# Patient Record
Sex: FEMALE | Race: WHITE | NOT HISPANIC OR LATINO | Employment: FULL TIME | URBAN - METROPOLITAN AREA
[De-identification: names, ages, dates, MRNs, and addresses within clinical notes are randomized per-mention and may not be internally consistent; named-entity substitution may affect disease eponyms.]

---

## 2021-05-20 ENCOUNTER — OFFICE VISIT (OUTPATIENT)
Dept: PSYCHIATRY | Facility: CLINIC | Age: 26
End: 2021-05-20
Payer: COMMERCIAL

## 2021-05-20 VITALS
RESPIRATION RATE: 19 BRPM | DIASTOLIC BLOOD PRESSURE: 85 MMHG | BODY MASS INDEX: 34.71 KG/M2 | WEIGHT: 208.31 LBS | HEIGHT: 65 IN | HEART RATE: 114 BPM | SYSTOLIC BLOOD PRESSURE: 128 MMHG

## 2021-05-20 DIAGNOSIS — F41.1 GAD (GENERALIZED ANXIETY DISORDER): ICD-10-CM

## 2021-05-20 DIAGNOSIS — F33.1 MODERATE EPISODE OF RECURRENT MAJOR DEPRESSIVE DISORDER: Primary | ICD-10-CM

## 2021-05-20 DIAGNOSIS — F41.0 PANIC ATTACKS: ICD-10-CM

## 2021-05-20 PROCEDURE — 99999 PR PBB SHADOW E&M-NEW PATIENT-LVL IV: ICD-10-PCS | Mod: PBBFAC,,, | Performed by: STUDENT IN AN ORGANIZED HEALTH CARE EDUCATION/TRAINING PROGRAM

## 2021-05-20 PROCEDURE — 99999 PR PBB SHADOW E&M-NEW PATIENT-LVL IV: CPT | Mod: PBBFAC,,, | Performed by: STUDENT IN AN ORGANIZED HEALTH CARE EDUCATION/TRAINING PROGRAM

## 2021-05-20 PROCEDURE — 90792 PR PSYCHIATRIC DIAGNOSTIC EVALUATION W/MEDICAL SERVICES: ICD-10-PCS | Mod: S$GLB,,, | Performed by: STUDENT IN AN ORGANIZED HEALTH CARE EDUCATION/TRAINING PROGRAM

## 2021-05-20 PROCEDURE — 3008F PR BODY MASS INDEX (BMI) DOCUMENTED: ICD-10-PCS | Mod: CPTII,S$GLB,, | Performed by: STUDENT IN AN ORGANIZED HEALTH CARE EDUCATION/TRAINING PROGRAM

## 2021-05-20 PROCEDURE — 1125F PR PAIN SEVERITY QUANTIFIED, PAIN PRESENT: ICD-10-PCS | Mod: S$GLB,,, | Performed by: STUDENT IN AN ORGANIZED HEALTH CARE EDUCATION/TRAINING PROGRAM

## 2021-05-20 PROCEDURE — 1125F AMNT PAIN NOTED PAIN PRSNT: CPT | Mod: S$GLB,,, | Performed by: STUDENT IN AN ORGANIZED HEALTH CARE EDUCATION/TRAINING PROGRAM

## 2021-05-20 PROCEDURE — 90792 PSYCH DIAG EVAL W/MED SRVCS: CPT | Mod: S$GLB,,, | Performed by: STUDENT IN AN ORGANIZED HEALTH CARE EDUCATION/TRAINING PROGRAM

## 2021-05-20 PROCEDURE — 3008F BODY MASS INDEX DOCD: CPT | Mod: CPTII,S$GLB,, | Performed by: STUDENT IN AN ORGANIZED HEALTH CARE EDUCATION/TRAINING PROGRAM

## 2021-05-20 RX ORDER — TRIAMCINOLONE ACETONIDE 55 UG/1
2 SPRAY, METERED NASAL DAILY
COMMUNITY

## 2021-05-20 RX ORDER — MELATONIN 3 MG
CAPSULE ORAL
COMMUNITY
End: 2021-05-20 | Stop reason: ALTCHOICE

## 2021-05-20 RX ORDER — CYCLOBENZAPRINE HCL 5 MG
5 TABLET ORAL NIGHTLY
COMMUNITY
End: 2022-09-15 | Stop reason: ALTCHOICE

## 2021-05-20 RX ORDER — DIPHENHYDRAMINE HCL 50 MG
50 CAPSULE ORAL EVERY 6 HOURS PRN
COMMUNITY

## 2021-05-20 RX ORDER — INCONTINENCE PAD,LINER,DISP
EACH MISCELLANEOUS 2 TIMES DAILY
COMMUNITY
End: 2022-09-15

## 2021-05-20 RX ORDER — GUAIFENESIN, PSEUDOEPHEDRINE HYDROCHLORIDE 600; 60 MG/1; MG/1
1 TABLET, EXTENDED RELEASE ORAL
COMMUNITY
End: 2023-07-19 | Stop reason: ALTCHOICE

## 2021-05-20 RX ORDER — CETIRIZINE HYDROCHLORIDE 10 MG/1
10 TABLET ORAL DAILY
COMMUNITY

## 2021-05-20 RX ORDER — ESCITALOPRAM OXALATE 20 MG/1
20 TABLET ORAL NIGHTLY
COMMUNITY
End: 2021-05-20 | Stop reason: ALTCHOICE

## 2021-05-20 RX ORDER — ASCORBIC ACID 500 MG
500 TABLET ORAL DAILY
COMMUNITY
End: 2021-08-26

## 2021-05-20 RX ORDER — ALPRAZOLAM 0.25 MG/1
0.25 TABLET ORAL 3 TIMES DAILY PRN
COMMUNITY
End: 2022-06-30 | Stop reason: DRUGHIGH

## 2021-05-20 RX ORDER — VORTIOXETINE 10 MG/1
TABLET, FILM COATED ORAL
Qty: 30 TABLET | Refills: 2 | Status: SHIPPED | OUTPATIENT
Start: 2021-05-20 | End: 2021-06-15 | Stop reason: ALTCHOICE

## 2021-05-20 RX ORDER — AZELASTINE HYDROCHLORIDE 0.5 MG/ML
1 SOLUTION/ DROPS OPHTHALMIC 2 TIMES DAILY
COMMUNITY

## 2021-05-20 RX ORDER — ACETAMINOPHEN 325 MG/1
325 TABLET ORAL EVERY 6 HOURS PRN
COMMUNITY
End: 2023-07-19 | Stop reason: ALTCHOICE

## 2021-06-15 ENCOUNTER — OFFICE VISIT (OUTPATIENT)
Dept: PSYCHIATRY | Facility: CLINIC | Age: 26
End: 2021-06-15
Payer: COMMERCIAL

## 2021-06-15 VITALS
RESPIRATION RATE: 19 BRPM | SYSTOLIC BLOOD PRESSURE: 122 MMHG | WEIGHT: 210.75 LBS | BODY MASS INDEX: 35.11 KG/M2 | HEIGHT: 65 IN | DIASTOLIC BLOOD PRESSURE: 82 MMHG | HEART RATE: 102 BPM

## 2021-06-15 DIAGNOSIS — F41.0 PANIC ATTACKS: ICD-10-CM

## 2021-06-15 DIAGNOSIS — F33.1 MODERATE EPISODE OF RECURRENT MAJOR DEPRESSIVE DISORDER: Primary | ICD-10-CM

## 2021-06-15 DIAGNOSIS — F41.1 GAD (GENERALIZED ANXIETY DISORDER): ICD-10-CM

## 2021-06-15 PROCEDURE — 1126F PR PAIN SEVERITY QUANTIFIED, NO PAIN PRESENT: ICD-10-PCS | Mod: S$GLB,,, | Performed by: STUDENT IN AN ORGANIZED HEALTH CARE EDUCATION/TRAINING PROGRAM

## 2021-06-15 PROCEDURE — 1126F AMNT PAIN NOTED NONE PRSNT: CPT | Mod: S$GLB,,, | Performed by: STUDENT IN AN ORGANIZED HEALTH CARE EDUCATION/TRAINING PROGRAM

## 2021-06-15 PROCEDURE — 99999 PR PBB SHADOW E&M-EST. PATIENT-LVL IV: ICD-10-PCS | Mod: PBBFAC,,, | Performed by: STUDENT IN AN ORGANIZED HEALTH CARE EDUCATION/TRAINING PROGRAM

## 2021-06-15 PROCEDURE — 99999 PR PBB SHADOW E&M-EST. PATIENT-LVL IV: CPT | Mod: PBBFAC,,, | Performed by: STUDENT IN AN ORGANIZED HEALTH CARE EDUCATION/TRAINING PROGRAM

## 2021-06-15 PROCEDURE — 3008F PR BODY MASS INDEX (BMI) DOCUMENTED: ICD-10-PCS | Mod: CPTII,S$GLB,, | Performed by: STUDENT IN AN ORGANIZED HEALTH CARE EDUCATION/TRAINING PROGRAM

## 2021-06-15 PROCEDURE — 3008F BODY MASS INDEX DOCD: CPT | Mod: CPTII,S$GLB,, | Performed by: STUDENT IN AN ORGANIZED HEALTH CARE EDUCATION/TRAINING PROGRAM

## 2021-06-15 PROCEDURE — 99214 OFFICE O/P EST MOD 30 MIN: CPT | Mod: S$GLB,,, | Performed by: STUDENT IN AN ORGANIZED HEALTH CARE EDUCATION/TRAINING PROGRAM

## 2021-06-15 PROCEDURE — 99214 PR OFFICE/OUTPT VISIT, EST, LEVL IV, 30-39 MIN: ICD-10-PCS | Mod: S$GLB,,, | Performed by: STUDENT IN AN ORGANIZED HEALTH CARE EDUCATION/TRAINING PROGRAM

## 2021-06-15 RX ORDER — BUTALBITAL, ACETAMINOPHEN, AND CAFFEINE 50; 300; 40 MG/1; MG/1; MG/1
CAPSULE ORAL
COMMUNITY
Start: 2021-01-04 | End: 2022-09-15 | Stop reason: ALTCHOICE

## 2021-06-15 RX ORDER — VORTIOXETINE 20 MG/1
20 TABLET, FILM COATED ORAL DAILY
Qty: 30 TABLET | Refills: 3 | Status: SHIPPED | OUTPATIENT
Start: 2021-06-15 | End: 2021-08-26 | Stop reason: SDUPTHER

## 2021-07-23 ENCOUNTER — OFFICE VISIT (OUTPATIENT)
Dept: PSYCHIATRY | Facility: CLINIC | Age: 26
End: 2021-07-23
Payer: COMMERCIAL

## 2021-07-23 VITALS
RESPIRATION RATE: 19 BRPM | HEIGHT: 63 IN | HEART RATE: 92 BPM | WEIGHT: 208.44 LBS | SYSTOLIC BLOOD PRESSURE: 130 MMHG | BODY MASS INDEX: 36.93 KG/M2 | DIASTOLIC BLOOD PRESSURE: 84 MMHG

## 2021-07-23 DIAGNOSIS — F33.1 MODERATE EPISODE OF RECURRENT MAJOR DEPRESSIVE DISORDER: Primary | ICD-10-CM

## 2021-07-23 DIAGNOSIS — F41.1 GAD (GENERALIZED ANXIETY DISORDER): ICD-10-CM

## 2021-07-23 DIAGNOSIS — F41.0 PANIC ATTACKS: ICD-10-CM

## 2021-07-23 PROCEDURE — 99999 PR PBB SHADOW E&M-EST. PATIENT-LVL IV: ICD-10-PCS | Mod: PBBFAC,,, | Performed by: STUDENT IN AN ORGANIZED HEALTH CARE EDUCATION/TRAINING PROGRAM

## 2021-07-23 PROCEDURE — 99214 PR OFFICE/OUTPT VISIT, EST, LEVL IV, 30-39 MIN: ICD-10-PCS | Mod: S$GLB,,, | Performed by: STUDENT IN AN ORGANIZED HEALTH CARE EDUCATION/TRAINING PROGRAM

## 2021-07-23 PROCEDURE — 1126F PR PAIN SEVERITY QUANTIFIED, NO PAIN PRESENT: ICD-10-PCS | Mod: CPTII,S$GLB,, | Performed by: STUDENT IN AN ORGANIZED HEALTH CARE EDUCATION/TRAINING PROGRAM

## 2021-07-23 PROCEDURE — 1126F AMNT PAIN NOTED NONE PRSNT: CPT | Mod: CPTII,S$GLB,, | Performed by: STUDENT IN AN ORGANIZED HEALTH CARE EDUCATION/TRAINING PROGRAM

## 2021-07-23 PROCEDURE — 99214 OFFICE O/P EST MOD 30 MIN: CPT | Mod: S$GLB,,, | Performed by: STUDENT IN AN ORGANIZED HEALTH CARE EDUCATION/TRAINING PROGRAM

## 2021-07-23 PROCEDURE — 3008F PR BODY MASS INDEX (BMI) DOCUMENTED: ICD-10-PCS | Mod: CPTII,S$GLB,, | Performed by: STUDENT IN AN ORGANIZED HEALTH CARE EDUCATION/TRAINING PROGRAM

## 2021-07-23 PROCEDURE — 99999 PR PBB SHADOW E&M-EST. PATIENT-LVL IV: CPT | Mod: PBBFAC,,, | Performed by: STUDENT IN AN ORGANIZED HEALTH CARE EDUCATION/TRAINING PROGRAM

## 2021-07-23 PROCEDURE — 3008F BODY MASS INDEX DOCD: CPT | Mod: CPTII,S$GLB,, | Performed by: STUDENT IN AN ORGANIZED HEALTH CARE EDUCATION/TRAINING PROGRAM

## 2021-07-23 RX ORDER — ZOLPIDEM TARTRATE 5 MG
5 TABLET ORAL NIGHTLY PRN
Qty: 30 TABLET | Refills: 2 | Status: SHIPPED | OUTPATIENT
Start: 2021-07-23 | End: 2022-02-22 | Stop reason: SDUPTHER

## 2021-07-23 RX ORDER — ZOLPIDEM TARTRATE 5 MG
5 TABLET ORAL NIGHTLY PRN
COMMUNITY
Start: 2021-01-27 | End: 2021-07-23 | Stop reason: SDUPTHER

## 2021-07-23 RX ORDER — DEXTROAMPHETAMINE SACCHARATE, AMPHETAMINE ASPARTATE, DEXTROAMPHETAMINE SULFATE AND AMPHETAMINE SULFATE 1.25; 1.25; 1.25; 1.25 MG/1; MG/1; MG/1; MG/1
5 TABLET ORAL 2 TIMES DAILY
Qty: 6 TABLET | Refills: 0 | Status: SHIPPED | OUTPATIENT
Start: 2021-07-23 | End: 2021-07-27 | Stop reason: ALTCHOICE

## 2021-07-27 RX ORDER — LURASIDONE HYDROCHLORIDE 20 MG/1
20 TABLET, FILM COATED ORAL DAILY
Qty: 30 TABLET | Refills: 3 | Status: SHIPPED | OUTPATIENT
Start: 2021-07-27 | End: 2021-10-13 | Stop reason: SDUPTHER

## 2021-08-26 ENCOUNTER — OFFICE VISIT (OUTPATIENT)
Dept: PSYCHIATRY | Facility: CLINIC | Age: 26
End: 2021-08-26
Payer: COMMERCIAL

## 2021-08-26 VITALS
WEIGHT: 207.88 LBS | HEART RATE: 113 BPM | OXYGEN SATURATION: 98 % | DIASTOLIC BLOOD PRESSURE: 86 MMHG | SYSTOLIC BLOOD PRESSURE: 118 MMHG | BODY MASS INDEX: 34.63 KG/M2 | HEIGHT: 65 IN | RESPIRATION RATE: 18 BRPM

## 2021-08-26 DIAGNOSIS — F41.1 GAD (GENERALIZED ANXIETY DISORDER): ICD-10-CM

## 2021-08-26 DIAGNOSIS — Z65.8 PSYCHOSOCIAL STRESSORS: ICD-10-CM

## 2021-08-26 DIAGNOSIS — F33.1 MODERATE EPISODE OF RECURRENT MAJOR DEPRESSIVE DISORDER: Primary | ICD-10-CM

## 2021-08-26 DIAGNOSIS — G47.00 INSOMNIA, UNSPECIFIED TYPE: ICD-10-CM

## 2021-08-26 DIAGNOSIS — R73.09 ELEVATED HEMOGLOBIN A1C: ICD-10-CM

## 2021-08-26 DIAGNOSIS — F41.0 PANIC ATTACKS: ICD-10-CM

## 2021-08-26 PROCEDURE — 3079F PR MOST RECENT DIASTOLIC BLOOD PRESSURE 80-89 MM HG: ICD-10-PCS | Mod: CPTII,S$GLB,, | Performed by: STUDENT IN AN ORGANIZED HEALTH CARE EDUCATION/TRAINING PROGRAM

## 2021-08-26 PROCEDURE — 3079F DIAST BP 80-89 MM HG: CPT | Mod: CPTII,S$GLB,, | Performed by: STUDENT IN AN ORGANIZED HEALTH CARE EDUCATION/TRAINING PROGRAM

## 2021-08-26 PROCEDURE — 1159F MED LIST DOCD IN RCRD: CPT | Mod: CPTII,S$GLB,, | Performed by: STUDENT IN AN ORGANIZED HEALTH CARE EDUCATION/TRAINING PROGRAM

## 2021-08-26 PROCEDURE — 99214 PR OFFICE/OUTPT VISIT, EST, LEVL IV, 30-39 MIN: ICD-10-PCS | Mod: S$GLB,,, | Performed by: STUDENT IN AN ORGANIZED HEALTH CARE EDUCATION/TRAINING PROGRAM

## 2021-08-26 PROCEDURE — 99214 OFFICE O/P EST MOD 30 MIN: CPT | Mod: S$GLB,,, | Performed by: STUDENT IN AN ORGANIZED HEALTH CARE EDUCATION/TRAINING PROGRAM

## 2021-08-26 PROCEDURE — 1126F PR PAIN SEVERITY QUANTIFIED, NO PAIN PRESENT: ICD-10-PCS | Mod: CPTII,S$GLB,, | Performed by: STUDENT IN AN ORGANIZED HEALTH CARE EDUCATION/TRAINING PROGRAM

## 2021-08-26 PROCEDURE — 99999 PR PBB SHADOW E&M-EST. PATIENT-LVL IV: CPT | Mod: PBBFAC,,, | Performed by: STUDENT IN AN ORGANIZED HEALTH CARE EDUCATION/TRAINING PROGRAM

## 2021-08-26 PROCEDURE — 99999 PR PBB SHADOW E&M-EST. PATIENT-LVL IV: ICD-10-PCS | Mod: PBBFAC,,, | Performed by: STUDENT IN AN ORGANIZED HEALTH CARE EDUCATION/TRAINING PROGRAM

## 2021-08-26 PROCEDURE — 3008F PR BODY MASS INDEX (BMI) DOCUMENTED: ICD-10-PCS | Mod: CPTII,S$GLB,, | Performed by: STUDENT IN AN ORGANIZED HEALTH CARE EDUCATION/TRAINING PROGRAM

## 2021-08-26 PROCEDURE — 3008F BODY MASS INDEX DOCD: CPT | Mod: CPTII,S$GLB,, | Performed by: STUDENT IN AN ORGANIZED HEALTH CARE EDUCATION/TRAINING PROGRAM

## 2021-08-26 PROCEDURE — 3074F PR MOST RECENT SYSTOLIC BLOOD PRESSURE < 130 MM HG: ICD-10-PCS | Mod: CPTII,S$GLB,, | Performed by: STUDENT IN AN ORGANIZED HEALTH CARE EDUCATION/TRAINING PROGRAM

## 2021-08-26 PROCEDURE — 1126F AMNT PAIN NOTED NONE PRSNT: CPT | Mod: CPTII,S$GLB,, | Performed by: STUDENT IN AN ORGANIZED HEALTH CARE EDUCATION/TRAINING PROGRAM

## 2021-08-26 PROCEDURE — 1160F RVW MEDS BY RX/DR IN RCRD: CPT | Mod: CPTII,S$GLB,, | Performed by: STUDENT IN AN ORGANIZED HEALTH CARE EDUCATION/TRAINING PROGRAM

## 2021-08-26 PROCEDURE — 3074F SYST BP LT 130 MM HG: CPT | Mod: CPTII,S$GLB,, | Performed by: STUDENT IN AN ORGANIZED HEALTH CARE EDUCATION/TRAINING PROGRAM

## 2021-08-26 PROCEDURE — 1160F PR REVIEW ALL MEDS BY PRESCRIBER/CLIN PHARMACIST DOCUMENTED: ICD-10-PCS | Mod: CPTII,S$GLB,, | Performed by: STUDENT IN AN ORGANIZED HEALTH CARE EDUCATION/TRAINING PROGRAM

## 2021-08-26 PROCEDURE — 1159F PR MEDICATION LIST DOCUMENTED IN MEDICAL RECORD: ICD-10-PCS | Mod: CPTII,S$GLB,, | Performed by: STUDENT IN AN ORGANIZED HEALTH CARE EDUCATION/TRAINING PROGRAM

## 2021-08-26 RX ORDER — VORTIOXETINE 20 MG/1
20 TABLET, FILM COATED ORAL DAILY
Qty: 30 TABLET | Refills: 3 | Status: SHIPPED | OUTPATIENT
Start: 2021-08-26 | End: 2021-10-13 | Stop reason: SDUPTHER

## 2021-08-26 RX ORDER — FLUTICASONE PROPIONATE AND SALMETEROL 50; 250 UG/1; UG/1
1 POWDER RESPIRATORY (INHALATION) 2 TIMES DAILY
COMMUNITY
Start: 2021-08-06 | End: 2022-02-22

## 2021-08-26 RX ORDER — ETONOGESTREL AND ETHINYL ESTRADIOL VAGINAL RING .015; .12 MG/D; MG/D
1 RING VAGINAL
COMMUNITY
End: 2022-08-19

## 2021-10-07 ENCOUNTER — TELEPHONE (OUTPATIENT)
Dept: PSYCHIATRY | Facility: CLINIC | Age: 26
End: 2021-10-07

## 2021-10-07 ENCOUNTER — TELEPHONE (OUTPATIENT)
Dept: ENDOCRINOLOGY | Facility: CLINIC | Age: 26
End: 2021-10-07

## 2021-10-13 ENCOUNTER — OFFICE VISIT (OUTPATIENT)
Dept: PSYCHIATRY | Facility: CLINIC | Age: 26
End: 2021-10-13
Payer: COMMERCIAL

## 2021-10-13 ENCOUNTER — OFFICE VISIT (OUTPATIENT)
Dept: ENDOCRINOLOGY | Facility: CLINIC | Age: 26
End: 2021-10-13
Payer: COMMERCIAL

## 2021-10-13 VITALS
HEIGHT: 65 IN | DIASTOLIC BLOOD PRESSURE: 84 MMHG | WEIGHT: 206.81 LBS | BODY MASS INDEX: 34.46 KG/M2 | HEART RATE: 84 BPM | OXYGEN SATURATION: 99 % | SYSTOLIC BLOOD PRESSURE: 124 MMHG

## 2021-10-13 VITALS
SYSTOLIC BLOOD PRESSURE: 130 MMHG | RESPIRATION RATE: 16 BRPM | HEIGHT: 65 IN | HEART RATE: 98 BPM | BODY MASS INDEX: 34.53 KG/M2 | WEIGHT: 207.25 LBS | DIASTOLIC BLOOD PRESSURE: 88 MMHG

## 2021-10-13 DIAGNOSIS — R73.09 ELEVATED HEMOGLOBIN A1C: ICD-10-CM

## 2021-10-13 DIAGNOSIS — F41.1 GAD (GENERALIZED ANXIETY DISORDER): ICD-10-CM

## 2021-10-13 DIAGNOSIS — Z65.8 PSYCHOSOCIAL STRESSORS: ICD-10-CM

## 2021-10-13 DIAGNOSIS — F33.1 MODERATE EPISODE OF RECURRENT MAJOR DEPRESSIVE DISORDER: Primary | ICD-10-CM

## 2021-10-13 DIAGNOSIS — F33.1 MODERATE EPISODE OF RECURRENT MAJOR DEPRESSIVE DISORDER: ICD-10-CM

## 2021-10-13 DIAGNOSIS — F41.0 PANIC ATTACKS: ICD-10-CM

## 2021-10-13 DIAGNOSIS — R73.03 PREDIABETES: ICD-10-CM

## 2021-10-13 PROCEDURE — 1159F PR MEDICATION LIST DOCUMENTED IN MEDICAL RECORD: ICD-10-PCS | Mod: CPTII,S$GLB,, | Performed by: STUDENT IN AN ORGANIZED HEALTH CARE EDUCATION/TRAINING PROGRAM

## 2021-10-13 PROCEDURE — 3008F PR BODY MASS INDEX (BMI) DOCUMENTED: ICD-10-PCS | Mod: CPTII,S$GLB,, | Performed by: STUDENT IN AN ORGANIZED HEALTH CARE EDUCATION/TRAINING PROGRAM

## 2021-10-13 PROCEDURE — 1160F RVW MEDS BY RX/DR IN RCRD: CPT | Mod: CPTII,S$GLB,, | Performed by: STUDENT IN AN ORGANIZED HEALTH CARE EDUCATION/TRAINING PROGRAM

## 2021-10-13 PROCEDURE — 1160F PR REVIEW ALL MEDS BY PRESCRIBER/CLIN PHARMACIST DOCUMENTED: ICD-10-PCS | Mod: CPTII,S$GLB,, | Performed by: STUDENT IN AN ORGANIZED HEALTH CARE EDUCATION/TRAINING PROGRAM

## 2021-10-13 PROCEDURE — 3008F BODY MASS INDEX DOCD: CPT | Mod: CPTII,S$GLB,, | Performed by: STUDENT IN AN ORGANIZED HEALTH CARE EDUCATION/TRAINING PROGRAM

## 2021-10-13 PROCEDURE — 99204 OFFICE O/P NEW MOD 45 MIN: CPT | Mod: S$GLB,,, | Performed by: STUDENT IN AN ORGANIZED HEALTH CARE EDUCATION/TRAINING PROGRAM

## 2021-10-13 PROCEDURE — 99214 PR OFFICE/OUTPT VISIT, EST, LEVL IV, 30-39 MIN: ICD-10-PCS | Mod: S$GLB,,, | Performed by: STUDENT IN AN ORGANIZED HEALTH CARE EDUCATION/TRAINING PROGRAM

## 2021-10-13 PROCEDURE — 99999 PR PBB SHADOW E&M-EST. PATIENT-LVL V: ICD-10-PCS | Mod: PBBFAC,,, | Performed by: STUDENT IN AN ORGANIZED HEALTH CARE EDUCATION/TRAINING PROGRAM

## 2021-10-13 PROCEDURE — 1159F MED LIST DOCD IN RCRD: CPT | Mod: CPTII,S$GLB,, | Performed by: STUDENT IN AN ORGANIZED HEALTH CARE EDUCATION/TRAINING PROGRAM

## 2021-10-13 PROCEDURE — 3075F PR MOST RECENT SYSTOLIC BLOOD PRESS GE 130-139MM HG: ICD-10-PCS | Mod: CPTII,S$GLB,, | Performed by: STUDENT IN AN ORGANIZED HEALTH CARE EDUCATION/TRAINING PROGRAM

## 2021-10-13 PROCEDURE — 3079F PR MOST RECENT DIASTOLIC BLOOD PRESSURE 80-89 MM HG: ICD-10-PCS | Mod: CPTII,S$GLB,, | Performed by: STUDENT IN AN ORGANIZED HEALTH CARE EDUCATION/TRAINING PROGRAM

## 2021-10-13 PROCEDURE — 3079F DIAST BP 80-89 MM HG: CPT | Mod: CPTII,S$GLB,, | Performed by: STUDENT IN AN ORGANIZED HEALTH CARE EDUCATION/TRAINING PROGRAM

## 2021-10-13 PROCEDURE — 99999 PR PBB SHADOW E&M-EST. PATIENT-LVL IV: CPT | Mod: PBBFAC,,, | Performed by: STUDENT IN AN ORGANIZED HEALTH CARE EDUCATION/TRAINING PROGRAM

## 2021-10-13 PROCEDURE — 99999 PR PBB SHADOW E&M-EST. PATIENT-LVL IV: ICD-10-PCS | Mod: PBBFAC,,, | Performed by: STUDENT IN AN ORGANIZED HEALTH CARE EDUCATION/TRAINING PROGRAM

## 2021-10-13 PROCEDURE — 3074F PR MOST RECENT SYSTOLIC BLOOD PRESSURE < 130 MM HG: ICD-10-PCS | Mod: CPTII,S$GLB,, | Performed by: STUDENT IN AN ORGANIZED HEALTH CARE EDUCATION/TRAINING PROGRAM

## 2021-10-13 PROCEDURE — 99214 OFFICE O/P EST MOD 30 MIN: CPT | Mod: S$GLB,,, | Performed by: STUDENT IN AN ORGANIZED HEALTH CARE EDUCATION/TRAINING PROGRAM

## 2021-10-13 PROCEDURE — 3044F HG A1C LEVEL LT 7.0%: CPT | Mod: CPTII,S$GLB,, | Performed by: STUDENT IN AN ORGANIZED HEALTH CARE EDUCATION/TRAINING PROGRAM

## 2021-10-13 PROCEDURE — 99204 PR OFFICE/OUTPT VISIT, NEW, LEVL IV, 45-59 MIN: ICD-10-PCS | Mod: S$GLB,,, | Performed by: STUDENT IN AN ORGANIZED HEALTH CARE EDUCATION/TRAINING PROGRAM

## 2021-10-13 PROCEDURE — 99999 PR PBB SHADOW E&M-EST. PATIENT-LVL V: CPT | Mod: PBBFAC,,, | Performed by: STUDENT IN AN ORGANIZED HEALTH CARE EDUCATION/TRAINING PROGRAM

## 2021-10-13 PROCEDURE — 3044F PR MOST RECENT HEMOGLOBIN A1C LEVEL <7.0%: ICD-10-PCS | Mod: CPTII,S$GLB,, | Performed by: STUDENT IN AN ORGANIZED HEALTH CARE EDUCATION/TRAINING PROGRAM

## 2021-10-13 PROCEDURE — 3074F SYST BP LT 130 MM HG: CPT | Mod: CPTII,S$GLB,, | Performed by: STUDENT IN AN ORGANIZED HEALTH CARE EDUCATION/TRAINING PROGRAM

## 2021-10-13 PROCEDURE — 3075F SYST BP GE 130 - 139MM HG: CPT | Mod: CPTII,S$GLB,, | Performed by: STUDENT IN AN ORGANIZED HEALTH CARE EDUCATION/TRAINING PROGRAM

## 2021-10-13 RX ORDER — DEXAMETHASONE 1 MG/1
1 TABLET ORAL ONCE
Qty: 1 TABLET | Refills: 0 | Status: SHIPPED | OUTPATIENT
Start: 2021-10-13 | End: 2021-10-13

## 2021-10-13 RX ORDER — VORTIOXETINE 20 MG/1
20 TABLET, FILM COATED ORAL DAILY
Qty: 30 TABLET | Refills: 3 | Status: SHIPPED | OUTPATIENT
Start: 2021-10-13 | End: 2021-12-09

## 2021-10-13 RX ORDER — LURASIDONE HYDROCHLORIDE 40 MG/1
40 TABLET, FILM COATED ORAL DAILY
Qty: 30 TABLET | Refills: 3 | Status: SHIPPED | OUTPATIENT
Start: 2021-10-13 | End: 2021-12-15

## 2021-10-14 ENCOUNTER — LAB VISIT (OUTPATIENT)
Dept: LAB | Facility: HOSPITAL | Age: 26
End: 2021-10-14
Attending: STUDENT IN AN ORGANIZED HEALTH CARE EDUCATION/TRAINING PROGRAM
Payer: COMMERCIAL

## 2021-10-14 DIAGNOSIS — R73.03 PREDIABETES: ICD-10-CM

## 2021-10-14 DIAGNOSIS — R73.09 ELEVATED HEMOGLOBIN A1C: ICD-10-CM

## 2021-10-14 LAB
ALBUMIN SERPL BCP-MCNC: 3.6 G/DL (ref 3.5–5.2)
ALP SERPL-CCNC: 77 U/L (ref 55–135)
ALT SERPL W/O P-5'-P-CCNC: 33 U/L (ref 10–44)
ANION GAP SERPL CALC-SCNC: 10 MMOL/L (ref 8–16)
AST SERPL-CCNC: 20 U/L (ref 10–40)
BASOPHILS # BLD AUTO: 0.04 K/UL (ref 0–0.2)
BASOPHILS NFR BLD: 0.5 % (ref 0–1.9)
BILIRUB SERPL-MCNC: 0.5 MG/DL (ref 0.1–1)
BUN SERPL-MCNC: 12 MG/DL (ref 6–20)
CALCIUM SERPL-MCNC: 9.8 MG/DL (ref 8.7–10.5)
CHLORIDE SERPL-SCNC: 111 MMOL/L (ref 95–110)
CHOLEST SERPL-MCNC: 147 MG/DL (ref 120–199)
CHOLEST/HDLC SERPL: 3.1 {RATIO} (ref 2–5)
CO2 SERPL-SCNC: 19 MMOL/L (ref 23–29)
CORTIS SERPL-MCNC: 4.1 UG/DL (ref 4.3–22.4)
CREAT SERPL-MCNC: 0.8 MG/DL (ref 0.5–1.4)
DIFFERENTIAL METHOD: ABNORMAL
EOSINOPHIL # BLD AUTO: 0 K/UL (ref 0–0.5)
EOSINOPHIL NFR BLD: 0 % (ref 0–8)
ERYTHROCYTE [DISTWIDTH] IN BLOOD BY AUTOMATED COUNT: 13.7 % (ref 11.5–14.5)
EST. GFR  (AFRICAN AMERICAN): >60 ML/MIN/1.73 M^2
EST. GFR  (NON AFRICAN AMERICAN): >60 ML/MIN/1.73 M^2
ESTIMATED AVG GLUCOSE: 103 MG/DL (ref 68–131)
GLUCOSE SERPL-MCNC: 121 MG/DL (ref 70–110)
HBA1C MFR BLD: 5.2 % (ref 4–5.6)
HCT VFR BLD AUTO: 42.1 % (ref 37–48.5)
HDLC SERPL-MCNC: 47 MG/DL (ref 40–75)
HDLC SERPL: 32 % (ref 20–50)
HGB BLD-MCNC: 14.3 G/DL (ref 12–16)
IMM GRANULOCYTES # BLD AUTO: 0.02 K/UL (ref 0–0.04)
IMM GRANULOCYTES NFR BLD AUTO: 0.2 % (ref 0–0.5)
LDLC SERPL CALC-MCNC: 88.4 MG/DL (ref 63–159)
LYMPHOCYTES # BLD AUTO: 1.6 K/UL (ref 1–4.8)
LYMPHOCYTES NFR BLD: 18.3 % (ref 18–48)
MCH RBC QN AUTO: 29.5 PG (ref 27–31)
MCHC RBC AUTO-ENTMCNC: 34 G/DL (ref 32–36)
MCV RBC AUTO: 87 FL (ref 82–98)
MONOCYTES # BLD AUTO: 0.3 K/UL (ref 0.3–1)
MONOCYTES NFR BLD: 3.5 % (ref 4–15)
NEUTROPHILS # BLD AUTO: 6.9 K/UL (ref 1.8–7.7)
NEUTROPHILS NFR BLD: 77.5 % (ref 38–73)
NONHDLC SERPL-MCNC: 100 MG/DL
NRBC BLD-RTO: 0 /100 WBC
PLATELET # BLD AUTO: 341 K/UL (ref 150–450)
PMV BLD AUTO: 10.2 FL (ref 9.2–12.9)
POTASSIUM SERPL-SCNC: 4 MMOL/L (ref 3.5–5.1)
PROT SERPL-MCNC: 7.4 G/DL (ref 6–8.4)
RBC # BLD AUTO: 4.84 M/UL (ref 4–5.4)
SODIUM SERPL-SCNC: 140 MMOL/L (ref 136–145)
T4 FREE SERPL-MCNC: 0.87 NG/DL (ref 0.71–1.51)
TRIGL SERPL-MCNC: 58 MG/DL (ref 30–150)
TSH SERPL DL<=0.005 MIU/L-ACNC: 0.79 UIU/ML (ref 0.4–4)
WBC # BLD AUTO: 8.85 K/UL (ref 3.9–12.7)

## 2021-10-14 PROCEDURE — 85025 COMPLETE CBC W/AUTO DIFF WBC: CPT | Performed by: STUDENT IN AN ORGANIZED HEALTH CARE EDUCATION/TRAINING PROGRAM

## 2021-10-14 PROCEDURE — 84439 ASSAY OF FREE THYROXINE: CPT | Performed by: STUDENT IN AN ORGANIZED HEALTH CARE EDUCATION/TRAINING PROGRAM

## 2021-10-14 PROCEDURE — 36415 COLL VENOUS BLD VENIPUNCTURE: CPT | Performed by: STUDENT IN AN ORGANIZED HEALTH CARE EDUCATION/TRAINING PROGRAM

## 2021-10-14 PROCEDURE — 80061 LIPID PANEL: CPT | Performed by: STUDENT IN AN ORGANIZED HEALTH CARE EDUCATION/TRAINING PROGRAM

## 2021-10-14 PROCEDURE — 82542 COL CHROMOTOGRAPHY QUAL/QUAN: CPT | Performed by: STUDENT IN AN ORGANIZED HEALTH CARE EDUCATION/TRAINING PROGRAM

## 2021-10-14 PROCEDURE — 83036 HEMOGLOBIN GLYCOSYLATED A1C: CPT | Performed by: STUDENT IN AN ORGANIZED HEALTH CARE EDUCATION/TRAINING PROGRAM

## 2021-10-14 PROCEDURE — 80053 COMPREHEN METABOLIC PANEL: CPT | Performed by: STUDENT IN AN ORGANIZED HEALTH CARE EDUCATION/TRAINING PROGRAM

## 2021-10-14 PROCEDURE — 84443 ASSAY THYROID STIM HORMONE: CPT | Performed by: STUDENT IN AN ORGANIZED HEALTH CARE EDUCATION/TRAINING PROGRAM

## 2021-10-14 PROCEDURE — 82533 TOTAL CORTISOL: CPT | Performed by: STUDENT IN AN ORGANIZED HEALTH CARE EDUCATION/TRAINING PROGRAM

## 2021-10-15 ENCOUNTER — PATIENT MESSAGE (OUTPATIENT)
Dept: ENDOCRINOLOGY | Facility: CLINIC | Age: 26
End: 2021-10-15

## 2021-10-19 ENCOUNTER — PATIENT MESSAGE (OUTPATIENT)
Dept: ENDOCRINOLOGY | Facility: CLINIC | Age: 26
End: 2021-10-19
Payer: COMMERCIAL

## 2021-10-19 DIAGNOSIS — R73.03 PREDIABETES: Primary | ICD-10-CM

## 2021-10-22 ENCOUNTER — LAB VISIT (OUTPATIENT)
Dept: LAB | Facility: HOSPITAL | Age: 26
End: 2021-10-22
Attending: STUDENT IN AN ORGANIZED HEALTH CARE EDUCATION/TRAINING PROGRAM
Payer: COMMERCIAL

## 2021-10-22 DIAGNOSIS — R73.03 PREDIABETES: ICD-10-CM

## 2021-10-22 LAB
CREAT 24H UR-MRATE: 62.2 MG/HR (ref 40–75)
CREAT UR-MCNC: 106.7 MG/DL (ref 15–325)
CREATININE, URINE (MG/SPEC): 1493.8 MG/SPEC
URINE COLLECTION DURATION: 24 HR
URINE VOLUME: 1400 ML

## 2021-10-22 PROCEDURE — 82530 CORTISOL FREE: CPT | Performed by: STUDENT IN AN ORGANIZED HEALTH CARE EDUCATION/TRAINING PROGRAM

## 2021-10-22 PROCEDURE — 82570 ASSAY OF URINE CREATININE: CPT | Performed by: STUDENT IN AN ORGANIZED HEALTH CARE EDUCATION/TRAINING PROGRAM

## 2021-10-25 LAB — DEXAMETHASONE SERPL-MCNC: NORMAL NG/DL

## 2021-10-26 LAB
COLLECT DURATION TIME UR: 24 H
CORTIS 24H UR-MRATE: 14 MCG/24 H (ref 3.5–45)
SPECIMEN VOL ?TM UR: 1400 ML

## 2021-12-14 ENCOUNTER — TELEPHONE (OUTPATIENT)
Dept: PSYCHIATRY | Facility: CLINIC | Age: 26
End: 2021-12-14
Payer: COMMERCIAL

## 2021-12-14 ENCOUNTER — PATIENT MESSAGE (OUTPATIENT)
Dept: PSYCHIATRY | Facility: CLINIC | Age: 26
End: 2021-12-14
Payer: COMMERCIAL

## 2021-12-15 RX ORDER — LURASIDONE HYDROCHLORIDE 20 MG/1
20 TABLET, FILM COATED ORAL DAILY
Qty: 30 TABLET | Refills: 3 | Status: SHIPPED | OUTPATIENT
Start: 2021-12-15 | End: 2021-12-17 | Stop reason: ALTCHOICE

## 2021-12-16 ENCOUNTER — TELEPHONE (OUTPATIENT)
Dept: PSYCHIATRY | Facility: CLINIC | Age: 26
End: 2021-12-16
Payer: COMMERCIAL

## 2021-12-16 ENCOUNTER — PATIENT MESSAGE (OUTPATIENT)
Dept: PSYCHIATRY | Facility: CLINIC | Age: 26
End: 2021-12-16
Payer: COMMERCIAL

## 2021-12-17 ENCOUNTER — TELEPHONE (OUTPATIENT)
Dept: PSYCHIATRY | Facility: CLINIC | Age: 26
End: 2021-12-17
Payer: COMMERCIAL

## 2021-12-17 RX ORDER — VENLAFAXINE HYDROCHLORIDE 37.5 MG/1
CAPSULE, EXTENDED RELEASE ORAL
Qty: 60 CAPSULE | Refills: 3 | Status: SHIPPED | OUTPATIENT
Start: 2021-12-17 | End: 2022-01-14 | Stop reason: SDUPTHER

## 2022-01-14 ENCOUNTER — OFFICE VISIT (OUTPATIENT)
Dept: PSYCHIATRY | Facility: CLINIC | Age: 27
End: 2022-01-14
Payer: COMMERCIAL

## 2022-01-14 ENCOUNTER — LAB VISIT (OUTPATIENT)
Dept: LAB | Facility: HOSPITAL | Age: 27
End: 2022-01-14
Attending: STUDENT IN AN ORGANIZED HEALTH CARE EDUCATION/TRAINING PROGRAM
Payer: OTHER GOVERNMENT

## 2022-01-14 ENCOUNTER — OFFICE VISIT (OUTPATIENT)
Dept: ENDOCRINOLOGY | Facility: CLINIC | Age: 27
End: 2022-01-14
Payer: COMMERCIAL

## 2022-01-14 VITALS
SYSTOLIC BLOOD PRESSURE: 116 MMHG | BODY MASS INDEX: 34.56 KG/M2 | HEIGHT: 65 IN | DIASTOLIC BLOOD PRESSURE: 82 MMHG | WEIGHT: 207.44 LBS

## 2022-01-14 VITALS
WEIGHT: 206 LBS | DIASTOLIC BLOOD PRESSURE: 76 MMHG | OXYGEN SATURATION: 99 % | HEART RATE: 73 BPM | HEIGHT: 65 IN | BODY MASS INDEX: 34.32 KG/M2 | SYSTOLIC BLOOD PRESSURE: 116 MMHG | RESPIRATION RATE: 17 BRPM

## 2022-01-14 DIAGNOSIS — G47.00 INSOMNIA, UNSPECIFIED TYPE: ICD-10-CM

## 2022-01-14 DIAGNOSIS — R73.09 ELEVATED HEMOGLOBIN A1C: Primary | ICD-10-CM

## 2022-01-14 DIAGNOSIS — F41.1 GAD (GENERALIZED ANXIETY DISORDER): ICD-10-CM

## 2022-01-14 DIAGNOSIS — Z65.8 PSYCHOSOCIAL STRESSORS: ICD-10-CM

## 2022-01-14 DIAGNOSIS — F33.1 MODERATE EPISODE OF RECURRENT MAJOR DEPRESSIVE DISORDER: Primary | ICD-10-CM

## 2022-01-14 DIAGNOSIS — R73.09 ELEVATED HEMOGLOBIN A1C: ICD-10-CM

## 2022-01-14 DIAGNOSIS — R73.03 PREDIABETES: ICD-10-CM

## 2022-01-14 DIAGNOSIS — F41.0 PANIC ATTACKS: ICD-10-CM

## 2022-01-14 LAB
ESTIMATED AVG GLUCOSE: 108 MG/DL (ref 68–131)
HBA1C MFR BLD: 5.4 % (ref 4–5.6)

## 2022-01-14 PROCEDURE — 3079F PR MOST RECENT DIASTOLIC BLOOD PRESSURE 80-89 MM HG: ICD-10-PCS | Mod: CPTII,S$GLB,, | Performed by: STUDENT IN AN ORGANIZED HEALTH CARE EDUCATION/TRAINING PROGRAM

## 2022-01-14 PROCEDURE — 3074F PR MOST RECENT SYSTOLIC BLOOD PRESSURE < 130 MM HG: ICD-10-PCS | Mod: CPTII,S$GLB,, | Performed by: STUDENT IN AN ORGANIZED HEALTH CARE EDUCATION/TRAINING PROGRAM

## 2022-01-14 PROCEDURE — 99999 PR PBB SHADOW E&M-EST. PATIENT-LVL III: ICD-10-PCS | Mod: PBBFAC,,, | Performed by: STUDENT IN AN ORGANIZED HEALTH CARE EDUCATION/TRAINING PROGRAM

## 2022-01-14 PROCEDURE — 3008F BODY MASS INDEX DOCD: CPT | Mod: CPTII,S$GLB,, | Performed by: STUDENT IN AN ORGANIZED HEALTH CARE EDUCATION/TRAINING PROGRAM

## 2022-01-14 PROCEDURE — 99214 OFFICE O/P EST MOD 30 MIN: CPT | Mod: S$GLB,,, | Performed by: STUDENT IN AN ORGANIZED HEALTH CARE EDUCATION/TRAINING PROGRAM

## 2022-01-14 PROCEDURE — 99213 OFFICE O/P EST LOW 20 MIN: CPT | Mod: PBBFAC | Performed by: STUDENT IN AN ORGANIZED HEALTH CARE EDUCATION/TRAINING PROGRAM

## 2022-01-14 PROCEDURE — 99999 PR PBB SHADOW E&M-EST. PATIENT-LVL III: CPT | Mod: PBBFAC,,, | Performed by: STUDENT IN AN ORGANIZED HEALTH CARE EDUCATION/TRAINING PROGRAM

## 2022-01-14 PROCEDURE — 3074F SYST BP LT 130 MM HG: CPT | Mod: CPTII,S$GLB,, | Performed by: STUDENT IN AN ORGANIZED HEALTH CARE EDUCATION/TRAINING PROGRAM

## 2022-01-14 PROCEDURE — 36415 COLL VENOUS BLD VENIPUNCTURE: CPT | Performed by: STUDENT IN AN ORGANIZED HEALTH CARE EDUCATION/TRAINING PROGRAM

## 2022-01-14 PROCEDURE — 99214 PR OFFICE/OUTPT VISIT, EST, LEVL IV, 30-39 MIN: ICD-10-PCS | Mod: S$GLB,,, | Performed by: STUDENT IN AN ORGANIZED HEALTH CARE EDUCATION/TRAINING PROGRAM

## 2022-01-14 PROCEDURE — 83036 HEMOGLOBIN GLYCOSYLATED A1C: CPT | Performed by: STUDENT IN AN ORGANIZED HEALTH CARE EDUCATION/TRAINING PROGRAM

## 2022-01-14 PROCEDURE — 1159F PR MEDICATION LIST DOCUMENTED IN MEDICAL RECORD: ICD-10-PCS | Mod: CPTII,S$GLB,, | Performed by: STUDENT IN AN ORGANIZED HEALTH CARE EDUCATION/TRAINING PROGRAM

## 2022-01-14 PROCEDURE — 1159F MED LIST DOCD IN RCRD: CPT | Mod: CPTII,S$GLB,, | Performed by: STUDENT IN AN ORGANIZED HEALTH CARE EDUCATION/TRAINING PROGRAM

## 2022-01-14 PROCEDURE — 3079F DIAST BP 80-89 MM HG: CPT | Mod: CPTII,S$GLB,, | Performed by: STUDENT IN AN ORGANIZED HEALTH CARE EDUCATION/TRAINING PROGRAM

## 2022-01-14 PROCEDURE — 3008F PR BODY MASS INDEX (BMI) DOCUMENTED: ICD-10-PCS | Mod: CPTII,S$GLB,, | Performed by: STUDENT IN AN ORGANIZED HEALTH CARE EDUCATION/TRAINING PROGRAM

## 2022-01-14 RX ORDER — VENLAFAXINE HYDROCHLORIDE 75 MG/1
75 CAPSULE, EXTENDED RELEASE ORAL DAILY
Qty: 30 CAPSULE | Refills: 3 | Status: SHIPPED | OUTPATIENT
Start: 2022-01-14 | End: 2022-02-22

## 2022-01-14 NOTE — PROGRESS NOTES
"  01/14/2022  4:20 PM  Rissa An  [id number]    Outpatient Psychiatry Follow-Up Visit (MD/NP)    1/14/2022    Clinical Status of Patient:  Outpatient (Ambulatory)    Chief Complaint:  Rissa An is a 26 y.o. female who presents today for follow-up of depression and anxiety.  Met with patient.        Interval History and Content of Current Session:  Interim Events/Subjective Report/Content of Current Session:       Reports insurance changed, trintellix and latuda were no longer covered, so had to be changed to Effexor.     Reports mood has been "irritable," some depressed days.     JENNIFER symptoms have been increased, "thinking about what if's," most of the time feels anxious, "I feel on edge a lot"    No recent panic attacks.    Reports was diagnosed with MARISELA, but unable to get CPAP due to being on back order.    Has started exercising.    States "my sex drive has gone out the window."        Psychiatric Review Of Systems - Is patient experiencing or having changes in:  sleep: "not restful"  appetite: "over eating"  energy/anergy: "[facility] there"  interest/pleasure/anhedonia: denies  Anxiety:  yes, increased  Panic: less  Guilty/hopelessness/worthlessness: yes, "lot of guilty feelings, it's excessive for small actions"  concentration: "okay"  S.I.B.s/risky behavior: no  Irritability: yes  Substance abuse: no  Racing thoughts: no  Impulsive behaviors: no  Paranoia: no  AVH: no          Medical Review of Systems   Review of Systems   Constitutional: Negative for chills and fever.   HENT: Negative for hearing loss.    Eyes: Negative for blurred vision and double vision.   Respiratory: Negative for shortness of breath.    Cardiovascular: Negative for chest pain.   Gastrointestinal: Negative for constipation, diarrhea, nausea and vomiting.   Genitourinary: Negative for dysuria.   Musculoskeletal: Negative for back pain and neck pain.   Skin: Negative for rash.   Neurological: Negative for dizziness and headaches. " "  Endo/Heme/Allergies: Negative for environmental allergies.       Past Medical, Family and Social History: The patient's past medical, family and social history have been reviewed and updated as appropriate within the electronic medical record - see encounter notes.        Social History     Socioeconomic History    Marital status:    Tobacco Use    Smoking status: Never Smoker    Smokeless tobacco: Never Used         Compliance: yes    Side effects: None    Risk Parameters:  Patient reports no suicidal ideation  Patient reports no homicidal ideation  Patient reports no self-injurious behavior  Patient reports no violent behavior          Exam (detailed: at least 9 elements; comprehensive: all 15 elements)     Vitals:    Vitals:    01/14/22 1229   BP: 116/76   Pulse: 73   Resp: 17   SpO2: 99%   Weight: 93.5 kg (206 lb 0.3 oz)   Height: 5' 5" (1.651 m)          Wt Readings from Last 4 Encounters:   10/13/21 94 kg (207 lb 3.7 oz)   10/13/21 93.8 kg (206 lb 12.7 oz)   09/21/21 94.6 kg (208 lb 9.6 oz)   08/26/21 94.3 kg (207 lb 14.3 oz)       Body mass index is 34.28 kg/m².          CONSTITUTIONAL  General Appearance: unremarkable, age appropriate    MUSCULOSKELETAL  Muscle Strength and Tone:no tremor, no tic  Abnormal Involuntary Movements: No  Gait and Station: non-ataxic    PSYCHIATRIC   Level of Consciousness: awake and alert   Orientation: person, place and situation  Grooming: Casually dressed and Well groomed  Psychomotor Behavior: normal, cooperative  Speech: normal tone, normal rate, normal pitch, normal volume  Language: grossly intact  Mood: "okay"  Affect: Consistent with mood  Thought Process: linear, logical  Associations: intact   Thought Content: DENIES suicidal ideation and DENIES homicidal ideation  Perceptions: denies hallucinations  Memory: Able to recall past events, Remote intact and Recent intact  Attention:Attends to interview without distraction  Fund of Knowledge: Aware of current " events and Vocabulary appropriate   Estimate if Intelligence:  Average based on work/education history, vocabulary and mental status exam  Insight: has awareness of illness  Judgment: behavior is adequate to circumstances        Assessment and Diagnosis   Status/Progress: Based on the examination today, the patient's problem(s) is/are adequately but not ideally controlled.  New problems have not been presented today.   Co-morbidities are complicating management of the primary condition.          General Impression:      MDD, recurrent, severe  JENNIFER with panic attacks  Psychosocial stressors  Obesity     Insomnia     R/o PTSD            Plan:     Pt declines changes to her medication at this time.    MDD, recurrent, severe  - continue effexor 75 mg PO qd  - continue psychotherapy with [facility]  - pt counseled on diet, exercise, etc.  - taking LMF supplementation  - using BLT  - pt counseled        JENNIFER with panic attacks  - continue Ambien 5 mg PO qhs as prescribed by previous provider, patient wishes to continue  - continue effexor 75 mg PO qd  - continue psychotherapy with [facility]  - pt counseled on diet, exercise, etc.  - taking LMF supplementation  - using BLT  - pt counseled        Psychosocial stressors  - pt counseled  - continue psychotherapy with [facility]        Obesity  - avoid medications with high potential for serious weight gain (remeron, seroquel, etc.)        Insomnia   - pt counseled  - referred to pulmonology for PSG/sleep study, reports was diagnosed with MARISELA, but unable to obtain CPAP yet        Elevated A1c  - referred to endocrine      - Instructed patient to keep all scheduled appointments, take medications as prescribed and abstain from substance abuse. Instructed to call 911 or present to ER for emergency including SI or HI.    - Discussed diagnosis, risks and benefits of proposed treatment above vs alternative treatments vs no treatment, and potential side effects of these treatments. Discussed the inherent  unpredictability of treatment. The patient expresses understanding of the above and displays the capacity to agree with this treatment given said understanding. Patient also agrees that, currently, the benefits outweigh the risks and would like to pursue this treatment at this time.         Reilly Infante III, MD    Return to Clinic: 1 month

## 2022-01-14 NOTE — PROGRESS NOTES
Subjective:      Patient ID: Rissa An is a 26 y.o. female.    Chief Complaint:  Follow-up (3 month)      History of Present Illness  This is a 26 y.o. female. with a past medical history of anxiety, depression, BMI 35 who presented for evaluation of an elevated A1c.    With regards to prediabetes  Diagnosed in 2021  Reports weight was around 150 lb when she got COVID in late 2020, since then unable to exercise due to lung issues - gained 50lb rapidly after - now saw Pulmonary, started on Spiriva - starting to exercise again  She saw Nutrition last year    Current diet: Using Noom  Current exercise: Restarted recently     Cycles on Nuvaring - cycles regular      Lab Results   Component Value Date    HGBA1C 5.2 10/14/2021     1 mg DST - however noted to be on E containing vaginal ring   10/14/2021    Cortisol, 8 AM 4.10 (L)      10/22/2021    Cortisol, 24H Ur 14     Lab Results   Component Value Date    CHOL 147 10/14/2021     Lab Results   Component Value Date    HDL 47 10/14/2021     Lab Results   Component Value Date    LDLCALC 88.4 10/14/2021     Lab Results   Component Value Date    TRIG 58 10/14/2021     Outside labs:  Early 2021: A1c 5.9%    April 2021  A1c 5.5%  TSH 1.4  Thyroxine 8.1 (N)    Aug 2021  A1c 5.8%  Glucose 86  Cr 0.82  AST/ALT 22/18  LDL 79  Tg 97  HDL 49      Mother - repots low A1c and maternal aunt hypoglycemia  Sister no issues with glucose  Father side of the family: 2 members with Prader-Willi syndrome    Wt Readings from Last 10 Encounters:   01/14/22 94.1 kg (207 lb 7.3 oz)   12/22/21 97 kg (213 lb 12.8 oz)   10/13/21 94 kg (207 lb 3.7 oz)   10/13/21 93.8 kg (206 lb 12.7 oz)   09/21/21 94.6 kg (208 lb 9.6 oz)   08/26/21 94.3 kg (207 lb 14.3 oz)   07/23/21 94.6 kg (208 lb 7.1 oz)   06/15/21 95.6 kg (210 lb 12.2 oz)   05/20/21 94.5 kg (208 lb 5.4 oz)   04/22/21 93.5 kg (206 lb 1.6 oz)           Review of Systems  As above    Social and family history reviewed  Current medications and  "allergies reviewed    Objective:   /82 (BP Location: Right arm, Patient Position: Sitting)   Ht 5' 5" (1.651 m)   Wt 94.1 kg (207 lb 7.3 oz)   BMI 34.52 kg/m²   Physical Exam   Alert, oriented    BP Readings from Last 1 Encounters:   01/14/22 116/82      Wt Readings from Last 1 Encounters:   01/14/22 0957 94.1 kg (207 lb 7.3 oz)     Body mass index is 34.52 kg/m².    Lab Review:   Lab Results   Component Value Date    HGBA1C 5.2 10/14/2021     Lab Results   Component Value Date    CHOL 147 10/14/2021    HDL 47 10/14/2021    LDLCALC 88.4 10/14/2021    TRIG 58 10/14/2021    CHOLHDL 32.0 10/14/2021     Lab Results   Component Value Date     10/14/2021    K 4.0 10/14/2021     (H) 10/14/2021    CO2 19 (L) 10/14/2021     (H) 10/14/2021    BUN 12 10/14/2021    CREATININE 0.8 10/14/2021    CALCIUM 9.8 10/14/2021    PROT 7.4 10/14/2021    ALBUMIN 3.6 10/14/2021    BILITOT 0.5 10/14/2021    ALKPHOS 77 10/14/2021    AST 20 10/14/2021    ALT 33 10/14/2021    ANIONGAP 10 10/14/2021    ESTGFRAFRICA >60 10/14/2021    EGFRNONAA >60 10/14/2021    TSH 0.790 10/14/2021       All pertinent labs reviewed    Assessment and Plan     Prediabetes  Last A1c in normal range with lifestyle changes. Will recheck today.    She wishes to get pregnant this year. Advised to ensure A1c remains < 5.5% for at least 6 months (first one was Oct 2021).    She also has lung issues so advised to discuss with Pulmonary.    She would be at risk for GDM so this was discussed.    Plan  - Recheck A1c  - Continue lifestyle modification  - RTC 6 months    BMI 34.0-34.9,adult  Continue lifestyle changes as above    Elevated hemoglobin A1c  Recheck A1c today    F/u 6 months    Atif Hanna MD  Endocrinology        "

## 2022-01-14 NOTE — ASSESSMENT & PLAN NOTE
Last A1c in normal range with lifestyle changes. Will recheck today.    She wishes to get pregnant this year. Advised to ensure A1c remains < 5.5% for at least 6 months (first one was Oct 2021).    She also has lung issues so advised to discuss with Pulmonary.    She would be at risk for GDM so this was discussed.    Plan  - Recheck A1c  - Continue lifestyle modification  - RTC 6 months

## 2022-02-22 ENCOUNTER — OFFICE VISIT (OUTPATIENT)
Dept: PSYCHIATRY | Facility: CLINIC | Age: 27
End: 2022-02-22
Payer: OTHER GOVERNMENT

## 2022-02-22 VITALS
SYSTOLIC BLOOD PRESSURE: 122 MMHG | RESPIRATION RATE: 17 BRPM | BODY MASS INDEX: 34.29 KG/M2 | DIASTOLIC BLOOD PRESSURE: 81 MMHG | WEIGHT: 205.81 LBS | HEART RATE: 104 BPM | OXYGEN SATURATION: 98 % | HEIGHT: 65 IN

## 2022-02-22 DIAGNOSIS — F41.1 GAD (GENERALIZED ANXIETY DISORDER): ICD-10-CM

## 2022-02-22 DIAGNOSIS — Z65.8 PSYCHOSOCIAL STRESSORS: ICD-10-CM

## 2022-02-22 DIAGNOSIS — F33.1 MODERATE EPISODE OF RECURRENT MAJOR DEPRESSIVE DISORDER: Primary | ICD-10-CM

## 2022-02-22 DIAGNOSIS — F41.0 PANIC ATTACKS: ICD-10-CM

## 2022-02-22 PROCEDURE — 99214 PR OFFICE/OUTPT VISIT, EST, LEVL IV, 30-39 MIN: ICD-10-PCS | Mod: S$PBB,,, | Performed by: STUDENT IN AN ORGANIZED HEALTH CARE EDUCATION/TRAINING PROGRAM

## 2022-02-22 PROCEDURE — 99999 PR PBB SHADOW E&M-EST. PATIENT-LVL V: CPT | Mod: PBBFAC,,, | Performed by: STUDENT IN AN ORGANIZED HEALTH CARE EDUCATION/TRAINING PROGRAM

## 2022-02-22 PROCEDURE — 90833 PSYTX W PT W E/M 30 MIN: CPT | Mod: ,,, | Performed by: STUDENT IN AN ORGANIZED HEALTH CARE EDUCATION/TRAINING PROGRAM

## 2022-02-22 PROCEDURE — 90833 PR PSYCHOTHERAPY W/PATIENT W/E&M, 30 MIN (ADD ON): ICD-10-PCS | Mod: ,,, | Performed by: STUDENT IN AN ORGANIZED HEALTH CARE EDUCATION/TRAINING PROGRAM

## 2022-02-22 PROCEDURE — 99999 PR PBB SHADOW E&M-EST. PATIENT-LVL V: ICD-10-PCS | Mod: PBBFAC,,, | Performed by: STUDENT IN AN ORGANIZED HEALTH CARE EDUCATION/TRAINING PROGRAM

## 2022-02-22 PROCEDURE — 99215 OFFICE O/P EST HI 40 MIN: CPT | Mod: PBBFAC | Performed by: STUDENT IN AN ORGANIZED HEALTH CARE EDUCATION/TRAINING PROGRAM

## 2022-02-22 PROCEDURE — 99214 OFFICE O/P EST MOD 30 MIN: CPT | Mod: S$PBB,,, | Performed by: STUDENT IN AN ORGANIZED HEALTH CARE EDUCATION/TRAINING PROGRAM

## 2022-02-22 RX ORDER — VENLAFAXINE HYDROCHLORIDE 150 MG/1
150 CAPSULE, EXTENDED RELEASE ORAL DAILY
Qty: 30 CAPSULE | Refills: 3 | Status: SHIPPED | OUTPATIENT
Start: 2022-02-22 | End: 2022-05-19 | Stop reason: SDUPTHER

## 2022-02-22 RX ORDER — ZOLPIDEM TARTRATE 5 MG
5 TABLET ORAL NIGHTLY PRN
Qty: 30 TABLET | Refills: 2 | Status: SHIPPED | OUTPATIENT
Start: 2022-02-22 | End: 2022-03-23 | Stop reason: ALTCHOICE
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
## 2022-03-23 ENCOUNTER — OFFICE VISIT (OUTPATIENT)
Dept: PSYCHIATRY | Facility: CLINIC | Age: 27
End: 2022-03-23
Payer: OTHER GOVERNMENT

## 2022-03-23 VITALS
RESPIRATION RATE: 17 BRPM | WEIGHT: 207.88 LBS | DIASTOLIC BLOOD PRESSURE: 79 MMHG | HEIGHT: 65 IN | OXYGEN SATURATION: 97 % | BODY MASS INDEX: 34.63 KG/M2 | HEART RATE: 97 BPM | SYSTOLIC BLOOD PRESSURE: 115 MMHG

## 2022-03-23 DIAGNOSIS — Z65.8 PSYCHOSOCIAL STRESSORS: ICD-10-CM

## 2022-03-23 DIAGNOSIS — F41.0 PANIC ATTACKS: ICD-10-CM

## 2022-03-23 DIAGNOSIS — F33.1 MODERATE EPISODE OF RECURRENT MAJOR DEPRESSIVE DISORDER: Primary | ICD-10-CM

## 2022-03-23 DIAGNOSIS — G47.00 INSOMNIA, UNSPECIFIED TYPE: ICD-10-CM

## 2022-03-23 DIAGNOSIS — F41.1 GAD (GENERALIZED ANXIETY DISORDER): ICD-10-CM

## 2022-03-23 PROCEDURE — 99999 PR PBB SHADOW E&M-EST. PATIENT-LVL V: CPT | Mod: PBBFAC,,, | Performed by: STUDENT IN AN ORGANIZED HEALTH CARE EDUCATION/TRAINING PROGRAM

## 2022-03-23 PROCEDURE — 99999 PR PBB SHADOW E&M-EST. PATIENT-LVL V: ICD-10-PCS | Mod: PBBFAC,,, | Performed by: STUDENT IN AN ORGANIZED HEALTH CARE EDUCATION/TRAINING PROGRAM

## 2022-03-23 PROCEDURE — 90833 PSYTX W PT W E/M 30 MIN: CPT | Mod: ,,, | Performed by: STUDENT IN AN ORGANIZED HEALTH CARE EDUCATION/TRAINING PROGRAM

## 2022-03-23 PROCEDURE — 90833 PR PSYCHOTHERAPY W/PATIENT W/E&M, 30 MIN (ADD ON): ICD-10-PCS | Mod: ,,, | Performed by: STUDENT IN AN ORGANIZED HEALTH CARE EDUCATION/TRAINING PROGRAM

## 2022-03-23 PROCEDURE — 99215 OFFICE O/P EST HI 40 MIN: CPT | Mod: PBBFAC | Performed by: STUDENT IN AN ORGANIZED HEALTH CARE EDUCATION/TRAINING PROGRAM

## 2022-03-23 PROCEDURE — 99214 PR OFFICE/OUTPT VISIT, EST, LEVL IV, 30-39 MIN: ICD-10-PCS | Mod: S$PBB,,, | Performed by: STUDENT IN AN ORGANIZED HEALTH CARE EDUCATION/TRAINING PROGRAM

## 2022-03-23 PROCEDURE — 99214 OFFICE O/P EST MOD 30 MIN: CPT | Mod: S$PBB,,, | Performed by: STUDENT IN AN ORGANIZED HEALTH CARE EDUCATION/TRAINING PROGRAM

## 2022-03-23 RX ORDER — ZOLPIDEM TARTRATE 10 MG/1
10 TABLET ORAL NIGHTLY PRN
Qty: 30 TABLET | Refills: 3 | Status: SHIPPED | OUTPATIENT
Start: 2022-03-23 | End: 2022-06-30 | Stop reason: ALTCHOICE

## 2022-03-23 RX ORDER — TOPIRAMATE 50 MG/1
50 TABLET, FILM COATED ORAL 2 TIMES DAILY
Qty: 60 TABLET | Refills: 3 | Status: SHIPPED | OUTPATIENT
Start: 2022-03-23 | End: 2022-05-19

## 2022-03-23 NOTE — PROGRESS NOTES
"      03/23/2022  4:20 PM  Rissa GARCIA  [id number]    Outpatient Psychiatry Follow-Up Visit (MD/NP)    3/23/2022    Clinical Status of Patient:  Outpatient (Ambulatory)    Chief Complaint:  Rissa GARCIA is a 26 y.o. female who presents today for follow-up of depression and anxiety.  Met with patient.        Interval History and Content of Current Session:  Interim Events/Subjective Report/Content of Current Session:     She states "some worry... but more racing and intrusive thoughts... I'll be thinking about anything under the sun... sometimes I can quiet it down, but other days it piles and get overwhelming," reports mood has been irritable, "my  will do something small and then I'm mad at him the rest of the day," occurs "a few times a week."    Reports mood has been "okay.... I've been consistently getting up and exercising," days of depressed mood are "pretty rare."    No recent panic attacks.    Has not been working "taking a break... I can work on me... but I'm a little bored at home.'      Psychiatric Review Of Systems - Is patient experiencing or having changes in:  sleep: yes, getting a CPAP tomorrow  appetite: over eating   energy/anergy: "not the best"  interest/pleasure/anhedonia: variable, "maybe"  Anxiety:  yes, continuing   Panic: no attacks recently  Guilty/hopelessness/worthlessness: yes, "a little guilt, but not as bad," for not doing things at home  concentration: denies  S.I.B.s/risky behavior: no  Irritability: yes  Substance abuse: no  Racing thoughts: no  Impulsive behaviors: no  Paranoia: no  AVH: no        Psychotherapy:  · Target symptoms: depression, anxiety   · Why chosen therapy is appropriate versus another modality: relevant to diagnosis  · Outcome monitoring methods: self-report  · Therapeutic intervention type: supportive psychotherapy  · Topics discussed/themes: educational, building skills sets for symptom management, symptom recognition  · The patient's response " to the intervention is accepting. The patient's progress toward treatment goals is good.   · Duration of intervention: 17 minutes.          Medical Review of Systems   Review of Systems   Constitutional: Negative for chills and fever.   HENT: Negative for hearing loss.    Eyes: Negative for blurred vision and double vision.   Respiratory: Negative for shortness of breath.    Cardiovascular: Negative for chest pain.   Gastrointestinal: Negative for constipation, diarrhea, nausea and vomiting.   Genitourinary: Negative for dysuria.   Musculoskeletal: Negative for back pain and neck pain.   Skin: Negative for rash.   Neurological: Negative for dizziness and headaches.   Endo/Heme/Allergies: Negative for environmental allergies.       Past Medical, Family and Social History: The patient's past medical, family and social history have been reviewed and updated as appropriate within the electronic medical record - see encounter notes.        Social History     Socioeconomic History    Marital status:    Tobacco Use    Smoking status: Never Smoker    Smokeless tobacco: Never Used         Compliance: yes    Side effects: insomnia    Risk Parameters:  Patient reports no suicidal ideation  Patient reports no homicidal ideation  Patient reports no self-injurious behavior  Patient reports no violent behavior        Exam (detailed: at least 9 elements; comprehensive: all 15 elements)     Vitals:    03/23/22 1315   BP: 115/79   Pulse: 97   Resp: 17     Body mass index is 34.6 kg/m².    Wt Readings from Last 4 Encounters:   03/23/22 94.3 kg (207 lb 14.3 oz)   03/10/22 93 kg (205 lb)   02/22/22 93.3 kg (205 lb 12.8 oz)   01/14/22 93.5 kg (206 lb 0.3 oz)         CONSTITUTIONAL  General Appearance: unremarkable, age appropriate    MUSCULOSKELETAL  Muscle Strength and Tone:no tremor, no tic  Abnormal Involuntary Movements: No  Gait and Station: non-ataxic    PSYCHIATRIC   Level of Consciousness: awake and alert   Orientation:  "person, place and situation  Grooming: Casually dressed and Well groomed  Psychomotor Behavior: normal, cooperative  Speech: normal tone, normal rate, normal pitch, normal volume  Language: grossly intact  Mood: "blank"  Affect: Consistent with mood  Thought Process: linear, logical  Associations: intact   Thought Content: DENIES suicidal ideation and DENIES homicidal ideation  Perceptions: denies hallucinations  Memory: Able to recall past events, Remote intact and Recent intact  Attention: Attends to interview without distraction  Fund of Knowledge: Aware of current events and Vocabulary appropriate   Estimate if Intelligence:  Average based on work/education history, vocabulary and mental status exam  Insight: has awareness of illness  Judgment: behavior is adequate to circumstances        Assessment and Diagnosis   Status/Progress: Based on the examination today, the patient's problem(s) is/are inadequately controlled.  New problems have not been presented today.   Co-morbidities are complicating management of the primary condition.          General Impression:      MDD, recurrent, severe  JENNIFER with panic attacks  Psychosocial stressors  Obesity     Insomnia     R/o PTSD              Plan:       MDD, recurrent, severe  - continue effexor 150 mg PO qd  - continue psychotherapy with [facility]  - pt counseled on diet, exercise, etc.  - taking LMF supplementation  - stop BLT  - pt counseled        JENNIFER with panic attacks  - increase ambien to 10 mg PO qhs PRN  - continue effexor 150 mg PO qd  - continue psychotherapy with [facility]  - pt counseled on diet, exercise, etc.  - taking LMF supplementation  - using BLT  - pt counseled        Psychosocial stressors  - pt counseled  - continue psychotherapy with [facility]        Obesity  - avoid medications with high potential for serious weight gain (remeron, seroquel, etc.)  - has been unable to lose weight despite dieting and exercising  - start topamax 25-50 mg PO qd x1 week then increase " to 100 mg PO qd       Insomnia   - pt counseled  - referred to pulmonology for PSG/sleep study, reports was diagnosed with MARISELA, but unable to obtain CPAP yet        Elevated A1c  - referred to endocrine, note reviewed        - Instructed patient to keep all scheduled appointments, take medications as prescribed and abstain from substance abuse. Instructed to call 911 or present to ER for emergency including SI or HI.    - Discussed diagnosis, risks and benefits of proposed treatment above vs alternative treatments vs no treatment, and potential side effects of these treatments. Discussed the inherent unpredictability of treatment. The patient expresses understanding of the above and displays the capacity to agree with this treatment given said understanding. Patient also agrees that, currently, the benefits outweigh the risks and would like to pursue this treatment at this time.         Reilly Infante III, MD    Return to Clinic: 2 months

## 2022-04-03 ENCOUNTER — PATIENT MESSAGE (OUTPATIENT)
Dept: PSYCHIATRY | Facility: CLINIC | Age: 27
End: 2022-04-03
Payer: OTHER GOVERNMENT

## 2022-04-04 ENCOUNTER — PATIENT MESSAGE (OUTPATIENT)
Dept: PSYCHIATRY | Facility: CLINIC | Age: 27
End: 2022-04-04
Payer: OTHER GOVERNMENT

## 2022-05-19 ENCOUNTER — OFFICE VISIT (OUTPATIENT)
Dept: PSYCHIATRY | Facility: CLINIC | Age: 27
End: 2022-05-19
Payer: OTHER GOVERNMENT

## 2022-05-19 VITALS
WEIGHT: 212.06 LBS | OXYGEN SATURATION: 97 % | HEIGHT: 65 IN | BODY MASS INDEX: 35.33 KG/M2 | DIASTOLIC BLOOD PRESSURE: 78 MMHG | HEART RATE: 67 BPM | RESPIRATION RATE: 17 BRPM | SYSTOLIC BLOOD PRESSURE: 110 MMHG

## 2022-05-19 DIAGNOSIS — F41.0 PANIC ATTACKS: ICD-10-CM

## 2022-05-19 DIAGNOSIS — F41.1 GAD (GENERALIZED ANXIETY DISORDER): ICD-10-CM

## 2022-05-19 DIAGNOSIS — F33.1 MODERATE EPISODE OF RECURRENT MAJOR DEPRESSIVE DISORDER: Primary | ICD-10-CM

## 2022-05-19 DIAGNOSIS — Z65.8 PSYCHOSOCIAL STRESSORS: ICD-10-CM

## 2022-05-19 PROCEDURE — 90833 PSYTX W PT W E/M 30 MIN: CPT | Mod: ,,, | Performed by: STUDENT IN AN ORGANIZED HEALTH CARE EDUCATION/TRAINING PROGRAM

## 2022-05-19 PROCEDURE — 99999 PR PBB SHADOW E&M-EST. PATIENT-LVL III: CPT | Mod: PBBFAC,,, | Performed by: STUDENT IN AN ORGANIZED HEALTH CARE EDUCATION/TRAINING PROGRAM

## 2022-05-19 PROCEDURE — 99214 PR OFFICE/OUTPT VISIT, EST, LEVL IV, 30-39 MIN: ICD-10-PCS | Mod: S$PBB,,, | Performed by: STUDENT IN AN ORGANIZED HEALTH CARE EDUCATION/TRAINING PROGRAM

## 2022-05-19 PROCEDURE — 99999 PR PBB SHADOW E&M-EST. PATIENT-LVL III: ICD-10-PCS | Mod: PBBFAC,,, | Performed by: STUDENT IN AN ORGANIZED HEALTH CARE EDUCATION/TRAINING PROGRAM

## 2022-05-19 PROCEDURE — 90833 PR PSYCHOTHERAPY W/PATIENT W/E&M, 30 MIN (ADD ON): ICD-10-PCS | Mod: ,,, | Performed by: STUDENT IN AN ORGANIZED HEALTH CARE EDUCATION/TRAINING PROGRAM

## 2022-05-19 PROCEDURE — 99214 OFFICE O/P EST MOD 30 MIN: CPT | Mod: S$PBB,,, | Performed by: STUDENT IN AN ORGANIZED HEALTH CARE EDUCATION/TRAINING PROGRAM

## 2022-05-19 PROCEDURE — 99213 OFFICE O/P EST LOW 20 MIN: CPT | Mod: PBBFAC | Performed by: STUDENT IN AN ORGANIZED HEALTH CARE EDUCATION/TRAINING PROGRAM

## 2022-05-19 RX ORDER — ATENOLOL 25 MG/1
TABLET ORAL
COMMUNITY
Start: 2022-05-11 | End: 2022-10-07

## 2022-05-19 RX ORDER — VENLAFAXINE HYDROCHLORIDE 150 MG/1
150 CAPSULE, EXTENDED RELEASE ORAL DAILY
Qty: 30 CAPSULE | Refills: 3 | Status: SHIPPED | OUTPATIENT
Start: 2022-05-19 | End: 2022-06-30 | Stop reason: ALTCHOICE

## 2022-05-19 RX ORDER — SUCRALFATE 1 G/1
1 TABLET ORAL 2 TIMES DAILY
COMMUNITY
Start: 2022-04-14 | End: 2022-09-15

## 2022-05-19 RX ORDER — SCOLOPAMINE TRANSDERMAL SYSTEM 1 MG/1
1 PATCH, EXTENDED RELEASE TRANSDERMAL
COMMUNITY
Start: 2022-04-15 | End: 2022-09-15

## 2022-05-19 NOTE — PROGRESS NOTES
"        05/19/2022  4:20 PM  Rissa Parker  [id number]    Outpatient Psychiatry Follow-Up Visit (MD/NP)    5/19/2022    Clinical Status of Patient:  Outpatient (Ambulatory)    Chief Complaint:  Rissa Parker is a 26 y.o. female who presents today for follow-up of depression and anxiety.  Met with patient.        Interval History and Content of Current Session:  Interim Events/Subjective Report/Content of Current Session:       Did not tolerate topamax for wt loss.    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 Plans to start trying to have a baby next month, discussed at length risks of AD/pregancy/MI regarding this, will see OBGYN next month as well.    No recent depressed mood, mood has been "okay."    Planning to start a new job soon.    Feels medication is "helping, definitely working, I can feel it."            Psychiatric Review Of Systems - Is patient experiencing or having changes in:  sleep: better  appetite: yes  energy/anergy: yes  interest/pleasure/anhedonia: variable  Anxiety:  yes  Panic: yes  Guilty/hopelessness/worthlessness: less  concentration: denies  S.I.B.s/risky behavior: no  Irritability: less  Substance abuse: no  Racing thoughts: no  Impulsive behaviors: no  Paranoia: no  AVH: no        Psychotherapy:  · Target symptoms: depression, anxiety   · Why chosen therapy is appropriate versus another modality: relevant to diagnosis  · Outcome monitoring methods: self-report  · Therapeutic intervention type: supportive psychotherapy  · Topics discussed/themes: educational  · The patient's response to the intervention is accepting. The patient's progress toward treatment goals is good.   · Duration of intervention: 16 minutes.          Medical Review of Systems   Review of Systems   Constitutional: Negative for chills and fever.   HENT: Negative for hearing loss.    Eyes: Negative for blurred vision and double vision. " "  Respiratory: Negative for shortness of breath.    Cardiovascular: Negative for chest pain.   Gastrointestinal: Negative for constipation, diarrhea, nausea and vomiting.   Genitourinary: Negative for dysuria.   Musculoskeletal: Negative for back pain and neck pain.   Skin: Negative for rash.   Neurological: Negative for dizziness and headaches.   Endo/Heme/Allergies: Negative for environmental allergies.       Past Medical, Family and Social History: The patient's past medical, family and social history have been reviewed and updated as appropriate within the electronic medical record - see encounter notes.        Social History     Socioeconomic History    Marital status:    Tobacco Use    Smoking status: Never Smoker    Smokeless tobacco: Never Used         Compliance: yes    Side effects: None    Risk Parameters:  Patient reports no suicidal ideation  Patient reports no homicidal ideation  Patient reports no self-injurious behavior  Patient reports no violent behavior        Exam (detailed: at least 9 elements; comprehensive: all 15 elements)     Vitals:    05/19/22 1248   BP: 110/78   Pulse: 67   Resp: 17     Body mass index is 35.29 kg/m².    Wt Readings from Last 4 Encounters:   05/19/22 96.2 kg (212 lb 1.3 oz)   03/23/22 94.3 kg (207 lb 14.3 oz)   03/10/22 93 kg (205 lb)   02/22/22 93.3 kg (205 lb 12.8 oz)         CONSTITUTIONAL  General Appearance: unremarkable, age appropriate    MUSCULOSKELETAL  Muscle Strength and Tone:no tremor, no tic  Abnormal Involuntary Movements: No  Gait and Station: non-ataxic    PSYCHIATRIC   Level of Consciousness: awake and alert   Orientation: person, place and situation  Grooming: Casually dressed and Well groomed  Psychomotor Behavior: normal, cooperative  Speech: normal tone, normal rate, normal pitch, normal volume  Language: grossly intact  Mood: "okay"  Affect: Consistent with mood  Thought Process: linear, logical  Associations: intact   Thought Content: " DENIES suicidal ideation and DENIES homicidal ideation  Perceptions: denies hallucinations  Memory: Able to recall past events, Remote intact and Recent intact  Attention: Attends to interview without distraction  Fund of Knowledge: Aware of current events and Vocabulary appropriate   Estimate if Intelligence:  Average based on work/education history, vocabulary and mental status exam  Insight: has awareness of illness  Judgment: behavior is adequate to circumstances          Assessment and Diagnosis   Status/Progress: Based on the examination today, the patient's problem(s) is/are adequately controlled.  New problems have not been presented today.   Co-morbidities are complicating management of the primary condition.          General Impression:      MDD, recurrent, severe  JENNIFER with panic attacks  Psychosocial stressors  Obesity     Insomnia     R/o PTSD              Plan:       MDD, recurrent, severe  - continue effexor 150 mg PO qd, will decide on continuing after OBGYN appointment  - continue psychotherapy with Patricia Portillo  - pt counseled on diet, exercise, etc.  - taking LMF supplementation  - pt counseled        JENNIFER with panic attacks  - continue ambien to 5 mg PO qhs PRN  - continue effexor 150 mg PO qd  - continue psychotherapy with [facility]  - pt counseled on diet, exercise, etc.  - taking LMF supplementation  - pt counseled        Psychosocial stressors  - pt counseled  - continue psychotherapy with [facility]        Obesity  - avoid medications with high potential for serious weight gain (remeron, seroquel, etc.)       Insomnia   - pt counseled  - referred to pulmonology for PSG/sleep study, reports was diagnosed with MARISELA          Elevated A1c  - referred to endocrine, note reviewed          - Instructed patient to keep all scheduled appointments, take medications as prescribed and abstain from substance abuse. Instructed to call 911 or present to ER for emergency including SI or HI.    - Discussed diagnosis, risks  and benefits of proposed treatment above vs alternative treatments vs no treatment, and potential side effects of these treatments. Discussed the inherent unpredictability of treatment. The patient expresses understanding of the above and displays the capacity to agree with this treatment given said understanding. Patient also agrees that, currently, the benefits outweigh the risks and would like to pursue this treatment at this time.         Reilly Infante III, MD    Return to Clinic: 1 month

## 2022-06-30 ENCOUNTER — OFFICE VISIT (OUTPATIENT)
Dept: PSYCHIATRY | Facility: CLINIC | Age: 27
End: 2022-06-30
Payer: OTHER GOVERNMENT

## 2022-06-30 VITALS
SYSTOLIC BLOOD PRESSURE: 108 MMHG | DIASTOLIC BLOOD PRESSURE: 74 MMHG | HEIGHT: 65 IN | BODY MASS INDEX: 34.63 KG/M2 | WEIGHT: 207.88 LBS | HEART RATE: 72 BPM | OXYGEN SATURATION: 97 % | RESPIRATION RATE: 17 BRPM

## 2022-06-30 DIAGNOSIS — F33.1 MODERATE EPISODE OF RECURRENT MAJOR DEPRESSIVE DISORDER: Primary | ICD-10-CM

## 2022-06-30 DIAGNOSIS — F41.0 PANIC ATTACKS: ICD-10-CM

## 2022-06-30 DIAGNOSIS — F41.1 GAD (GENERALIZED ANXIETY DISORDER): ICD-10-CM

## 2022-06-30 DIAGNOSIS — Z65.8 PSYCHOSOCIAL STRESSORS: ICD-10-CM

## 2022-06-30 PROCEDURE — 99214 OFFICE O/P EST MOD 30 MIN: CPT | Mod: S$PBB,,, | Performed by: STUDENT IN AN ORGANIZED HEALTH CARE EDUCATION/TRAINING PROGRAM

## 2022-06-30 PROCEDURE — 99214 PR OFFICE/OUTPT VISIT, EST, LEVL IV, 30-39 MIN: ICD-10-PCS | Mod: S$PBB,,, | Performed by: STUDENT IN AN ORGANIZED HEALTH CARE EDUCATION/TRAINING PROGRAM

## 2022-06-30 PROCEDURE — 99999 PR PBB SHADOW E&M-EST. PATIENT-LVL IV: ICD-10-PCS | Mod: PBBFAC,,, | Performed by: STUDENT IN AN ORGANIZED HEALTH CARE EDUCATION/TRAINING PROGRAM

## 2022-06-30 PROCEDURE — 90833 PR PSYCHOTHERAPY W/PATIENT W/E&M, 30 MIN (ADD ON): ICD-10-PCS | Mod: ,,, | Performed by: STUDENT IN AN ORGANIZED HEALTH CARE EDUCATION/TRAINING PROGRAM

## 2022-06-30 PROCEDURE — 99999 PR PBB SHADOW E&M-EST. PATIENT-LVL IV: CPT | Mod: PBBFAC,,, | Performed by: STUDENT IN AN ORGANIZED HEALTH CARE EDUCATION/TRAINING PROGRAM

## 2022-06-30 PROCEDURE — 90833 PSYTX W PT W E/M 30 MIN: CPT | Mod: ,,, | Performed by: STUDENT IN AN ORGANIZED HEALTH CARE EDUCATION/TRAINING PROGRAM

## 2022-06-30 PROCEDURE — 99214 OFFICE O/P EST MOD 30 MIN: CPT | Mod: PBBFAC | Performed by: STUDENT IN AN ORGANIZED HEALTH CARE EDUCATION/TRAINING PROGRAM

## 2022-06-30 RX ORDER — BUPROPION HYDROCHLORIDE 150 MG/1
150 TABLET ORAL DAILY
Qty: 30 TABLET | Refills: 2 | Status: SHIPPED | OUTPATIENT
Start: 2022-06-30 | End: 2022-09-15 | Stop reason: SDUPTHER

## 2022-06-30 RX ORDER — VENLAFAXINE HYDROCHLORIDE 37.5 MG/1
CAPSULE, EXTENDED RELEASE ORAL
Qty: 14 CAPSULE | Refills: 0 | Status: SHIPPED | OUTPATIENT
Start: 2022-06-30 | End: 2022-08-19

## 2022-06-30 RX ORDER — VENLAFAXINE HYDROCHLORIDE 75 MG/1
CAPSULE, EXTENDED RELEASE ORAL
Qty: 14 CAPSULE | Refills: 0 | Status: SHIPPED | OUTPATIENT
Start: 2022-06-30 | End: 2022-08-19

## 2022-06-30 RX ORDER — ALPRAZOLAM 0.25 MG/1
0.25 TABLET ORAL DAILY PRN
Qty: 10 TABLET | Refills: 2 | Status: SHIPPED | OUTPATIENT
Start: 2022-06-30 | End: 2023-07-19 | Stop reason: SDUPTHER

## 2022-06-30 NOTE — PROGRESS NOTES
"          06/30/2022  4:20 PM  Rissa Parker  [id number]    Outpatient Psychiatry Follow-Up Visit (MD/NP)    6/30/2022    Clinical Status of Patient:  Outpatient (Ambulatory)    Chief Complaint:  Rissa Parker is a 26 y.o. female who presents today for follow-up of depression and anxiety.  Met with patient.        Interval History and Content of Current Session:  Interim Events/Subjective Report/Content of Current Session:   MDD, recurrent, severe  JENNIFER with panic attacks  Psychosocial stressors  Obesity  Insomnia        Reports mood has been "pretty good, alright." She feels anxiety levels are "not the best," occurring daily, cites stressor of her mother moving in. No recent panic attacks. She is planning on trying to become pregnant next week. 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        Psychiatric Review Of Systems - Is patient experiencing or having changes in:  sleep: improved  appetite: increased  energy/anergy: "more steady"  interest/pleasure/anhedonia: yes  Anxiety:  yes  Panic: less  Guilty/hopelessness/worthlessness: yes, guilt "I'm afraid my mother has dementia" pt blames herself for not helping more  concentration: denies  S.I.B.s/risky behavior: no  Irritability: less  Substance abuse: no  Racing thoughts: no  Impulsive behaviors: no  Paranoia: no  AVH: no        Psychotherapy:  · Target symptoms: depression, anxiety   · Why chosen therapy is appropriate versus another modality: relevant to diagnosis  · Outcome monitoring methods: self-report  · Therapeutic intervention type: supportive psychotherapy  · Topics discussed/themes: educational, building skills sets for symptom management, symptom recognition, cognitive distortions  · The patient's response to the intervention is accepting. The patient's progress toward treatment goals is good.   · Duration of intervention: 18 minutes.          Medical Review of Systems   Review of Systems   Constitutional: Negative for chills and fever. " "  HENT: Negative for hearing loss.    Eyes: Negative for blurred vision and double vision.   Respiratory: Negative for shortness of breath.    Cardiovascular: Negative for chest pain.   Gastrointestinal: Negative for constipation, diarrhea, nausea and vomiting.   Genitourinary: Negative for dysuria.   Musculoskeletal: Negative for back pain and neck pain.   Skin: Negative for rash.   Neurological: Negative for dizziness and headaches.   Endo/Heme/Allergies: Negative for environmental allergies.       Past Medical, Family and Social History: The patient's past medical, family and social history have been reviewed and updated as appropriate within the electronic medical record - see encounter notes.        Social History     Socioeconomic History    Marital status:    Tobacco Use    Smoking status: Never Smoker    Smokeless tobacco: Never Used         Compliance: yes    Side effects: None    Risk Parameters:  Patient reports no suicidal ideation  Patient reports no homicidal ideation  Patient reports no self-injurious behavior  Patient reports no violent behavior        Exam (detailed: at least 9 elements; comprehensive: all 15 elements)     Vitals:    06/30/22 1246   BP: 108/74   Pulse: 72   Resp: 17       Wt Readings from Last 4 Encounters:   06/30/22 94.3 kg (207 lb 14.3 oz)   05/19/22 96.2 kg (212 lb 1.3 oz)   03/23/22 94.3 kg (207 lb 14.3 oz)   03/10/22 93 kg (205 lb)       Body mass index is 34.6 kg/m².      CONSTITUTIONAL  General Appearance: unremarkable, age appropriate    MUSCULOSKELETAL  Muscle Strength and Tone:no tremor, no tic  Abnormal Involuntary Movements: No  Gait and Station: non-ataxic    PSYCHIATRIC   Level of Consciousness: awake and alert   Orientation: person, place and situation  Grooming: Casually dressed and Well groomed  Psychomotor Behavior: normal, cooperative  Speech: normal tone, normal rate, normal pitch, normal volume  Language: grossly intact  Mood: "okay"  Affect: " Consistent with mood  Thought Process: linear, logical  Associations: intact   Thought Content: DENIES suicidal ideation and DENIES homicidal ideation  Perceptions: denies hallucinations  Memory: Able to recall past events, Remote intact and Recent intact  Attention: Attends to interview without distraction  Fund of Knowledge: Aware of current events and Vocabulary appropriate   Estimate if Intelligence:  Average based on work/education history, vocabulary and mental status exam  Insight: has awareness of illness  Judgment: behavior is adequate to circumstances          Assessment and Diagnosis   Status/Progress: Based on the examination today, the patient's problem(s) is/are adequately controlled.  New problems have not been presented today.   Co-morbidities are complicating management of the primary condition.          General Impression:      MDD, recurrent, severe  JENNIFER with panic attacks  Psychosocial stressors  Obesity     Insomnia     R/o PTSD              Plan:         MDD, recurrent, severe  - taper off effexor, decrease to 75 mg PO qd x 2 weeks then decrease to 37.5 mg PO qd x 2 weeks then stop (pt requests to switchdue to risk of  w/d, pt planning to become pregnant in near future, did not find SSRI's effective in past)  - start wellbutrin 150 mg PO qd (cleared by OB per patient, patient agrees B>R, category C risks extensively discussed)  - continue psychotherapy with Patricia Portillo  - pt counseled on diet, exercise, etc.  - taking LMF supplementation  - pt counseled        JENNIFER with panic attacks  - stop ambien  - start xanax 0.25 mg PO qd PRN (was effective in past per patient)  - continue psychotherapy with [facility]  - pt counseled on diet, exercise, etc.  - taking LMF supplementation  - pt counseled        Psychosocial stressors  - pt counseled  - continue psychotherapy with [facility]        Obesity  - avoid medications with high potential for serious weight gain (remeron, seroquel, etc.)       Insomnia    - pt counseled  - using CPAP now and sleeping better, feels more rested, no longer taking ambien        Elevated A1c  - referred to endocrine, note reviewed          - Instructed patient to keep all scheduled appointments, take medications as prescribed and abstain from substance abuse. Instructed to call 911 or present to ER for emergency including SI or HI.    - Discussed diagnosis, risks and benefits of proposed treatment above vs alternative treatments vs no treatment, and potential side effects of these treatments. Discussed the inherent unpredictability of treatment. The patient expresses understanding of the above and displays the capacity to agree with this treatment given said understanding. Patient also agrees that, currently, the benefits outweigh the risks and would like to pursue this treatment at this time.         Reilly Infante III, MD    Return to Clinic: 1 month

## 2022-07-12 DIAGNOSIS — R00.0 TACHYCARDIA: Primary | ICD-10-CM

## 2022-07-14 ENCOUNTER — HOSPITAL ENCOUNTER (OUTPATIENT)
Dept: PULMONOLOGY | Facility: HOSPITAL | Age: 27
Discharge: HOME | End: 2022-07-14
Attending: INTERNAL MEDICINE
Payer: OTHER GOVERNMENT

## 2022-07-14 DIAGNOSIS — R00.0 TACHYCARDIA: ICD-10-CM

## 2022-07-14 LAB
ASCENDING AORTA: 2.25 CM
AV INDEX (PROSTH): 0.87
AV MEAN GRADIENT: 4 MMHG
AV PEAK GRADIENT: 5 MMHG
AV VALVE AREA: 2.47 CM2
AV VELOCITY RATIO: 0.9
CV ECHO LV RWT: 0.37 CM
DOP CALC AO PEAK VEL: 1.17 M/S
DOP CALC AO VTI: 24.13 CM
DOP CALC LVOT AREA: 2.8 CM2
DOP CALC LVOT DIAMETER: 1.9 CM
DOP CALC LVOT PEAK VEL: 1.05 M/S
DOP CALC LVOT STROKE VOLUME: 59.51 CM3
DOP CALC RVOT PEAK VEL: 0.73 M/S
DOP CALC RVOT VTI: 16.01 CM
DOP CALCLVOT PEAK VEL VTI: 21 CM
E WAVE DECELERATION TIME: 233.8 MSEC
E/A RATIO: 1.66
E/E' RATIO: 6 M/S
ECHO LV POSTERIOR WALL: 0.88 CM (ref 0.6–1.1)
EJECTION FRACTION: 55 %
FRACTIONAL SHORTENING: 44 % (ref 28–44)
INTERVENTRICULAR SEPTUM: 0.93 CM (ref 0.6–1.1)
IVRT: 91.34 MSEC
LA MAJOR: 4.1 CM
LA MINOR: 3.93 CM
LA WIDTH: 2.4 CM
LEFT ATRIUM SIZE: 3.09 CM
LEFT ATRIUM VOLUME: 25.3 CM3
LEFT INTERNAL DIMENSION IN SYSTOLE: 2.64 CM (ref 2.1–4)
LEFT VENTRICLE DIASTOLIC VOLUME: 102.33 ML
LEFT VENTRICLE SYSTOLIC VOLUME: 25.46 ML
LEFT VENTRICULAR INTERNAL DIMENSION IN DIASTOLE: 4.7 CM (ref 3.5–6)
LEFT VENTRICULAR MASS: 143.76 G
LV LATERAL E/E' RATIO: 6.5 M/S
LV SEPTAL E/E' RATIO: 5.57 M/S
MV A" WAVE DURATION": 9.42 MSEC
MV PEAK A VEL: 0.47 M/S
MV PEAK E VEL: 0.78 M/S
MV STENOSIS PRESSURE HALF TIME: 67.8 MS
MV VALVE AREA P 1/2 METHOD: 3.24 CM2
PULM VEIN S/D RATIO: 0.82
PV MEAN GRADIENT: 1.55 MMHG
PV PEAK D VEL: 0.5 M/S
PV PEAK GRADIENT: 2.1 MMHG
PV PEAK S VEL: 0.41 M/S
PV PEAK VELOCITY: 0.35 CM/S
RA MAJOR: 3.62 CM
RIGHT VENTRICULAR END-DIASTOLIC DIMENSION: 2.54 CM
STJ: 2.25 CM
TDI LATERAL: 0.12 M/S
TDI SEPTAL: 0.14 M/S
TDI: 0.13 M/S

## 2022-07-14 PROCEDURE — 93306 TTE W/DOPPLER COMPLETE: CPT

## 2022-07-17 ENCOUNTER — PATIENT MESSAGE (OUTPATIENT)
Dept: PSYCHIATRY | Facility: CLINIC | Age: 27
End: 2022-07-17
Payer: OTHER GOVERNMENT

## 2022-08-19 ENCOUNTER — OFFICE VISIT (OUTPATIENT)
Dept: ENDOCRINOLOGY | Facility: CLINIC | Age: 27
End: 2022-08-19
Payer: OTHER GOVERNMENT

## 2022-08-19 ENCOUNTER — LAB VISIT (OUTPATIENT)
Dept: LAB | Facility: HOSPITAL | Age: 27
End: 2022-08-19
Attending: STUDENT IN AN ORGANIZED HEALTH CARE EDUCATION/TRAINING PROGRAM
Payer: OTHER GOVERNMENT

## 2022-08-19 VITALS
RESPIRATION RATE: 14 BRPM | DIASTOLIC BLOOD PRESSURE: 64 MMHG | SYSTOLIC BLOOD PRESSURE: 118 MMHG | HEIGHT: 65 IN | BODY MASS INDEX: 33.79 KG/M2 | HEART RATE: 87 BPM | WEIGHT: 202.81 LBS

## 2022-08-19 DIAGNOSIS — F41.1 GAD (GENERALIZED ANXIETY DISORDER): ICD-10-CM

## 2022-08-19 DIAGNOSIS — R73.03 PREDIABETES: Primary | ICD-10-CM

## 2022-08-19 DIAGNOSIS — R73.03 PREDIABETES: ICD-10-CM

## 2022-08-19 PROBLEM — R73.09 ELEVATED HEMOGLOBIN A1C: Status: RESOLVED | Noted: 2021-10-13 | Resolved: 2022-08-19

## 2022-08-19 LAB
ANION GAP SERPL CALC-SCNC: 11 MMOL/L (ref 8–16)
BUN SERPL-MCNC: 20 MG/DL (ref 6–20)
CALCIUM SERPL-MCNC: 9.7 MG/DL (ref 8.7–10.5)
CHLORIDE SERPL-SCNC: 108 MMOL/L (ref 95–110)
CO2 SERPL-SCNC: 25 MMOL/L (ref 23–29)
CREAT SERPL-MCNC: 0.8 MG/DL (ref 0.5–1.4)
EST. GFR  (NO RACE VARIABLE): >60 ML/MIN/1.73 M^2
ESTIMATED AVG GLUCOSE: 103 MG/DL (ref 68–131)
GLUCOSE SERPL-MCNC: 97 MG/DL (ref 70–110)
HBA1C MFR BLD: 5.2 % (ref 4–5.6)
POTASSIUM SERPL-SCNC: 4.3 MMOL/L (ref 3.5–5.1)
SODIUM SERPL-SCNC: 144 MMOL/L (ref 136–145)

## 2022-08-19 PROCEDURE — 83036 HEMOGLOBIN GLYCOSYLATED A1C: CPT | Performed by: STUDENT IN AN ORGANIZED HEALTH CARE EDUCATION/TRAINING PROGRAM

## 2022-08-19 PROCEDURE — 99999 PR PBB SHADOW E&M-EST. PATIENT-LVL V: CPT | Mod: PBBFAC,,, | Performed by: STUDENT IN AN ORGANIZED HEALTH CARE EDUCATION/TRAINING PROGRAM

## 2022-08-19 PROCEDURE — 99999 PR PBB SHADOW E&M-EST. PATIENT-LVL V: ICD-10-PCS | Mod: PBBFAC,,, | Performed by: STUDENT IN AN ORGANIZED HEALTH CARE EDUCATION/TRAINING PROGRAM

## 2022-08-19 PROCEDURE — 80048 BASIC METABOLIC PNL TOTAL CA: CPT | Performed by: STUDENT IN AN ORGANIZED HEALTH CARE EDUCATION/TRAINING PROGRAM

## 2022-08-19 PROCEDURE — 99215 OFFICE O/P EST HI 40 MIN: CPT | Mod: PBBFAC | Performed by: STUDENT IN AN ORGANIZED HEALTH CARE EDUCATION/TRAINING PROGRAM

## 2022-08-19 PROCEDURE — 99214 OFFICE O/P EST MOD 30 MIN: CPT | Mod: S$PBB,,, | Performed by: STUDENT IN AN ORGANIZED HEALTH CARE EDUCATION/TRAINING PROGRAM

## 2022-08-19 PROCEDURE — 99214 PR OFFICE/OUTPT VISIT, EST, LEVL IV, 30-39 MIN: ICD-10-PCS | Mod: S$PBB,,, | Performed by: STUDENT IN AN ORGANIZED HEALTH CARE EDUCATION/TRAINING PROGRAM

## 2022-08-19 PROCEDURE — 36415 COLL VENOUS BLD VENIPUNCTURE: CPT | Performed by: STUDENT IN AN ORGANIZED HEALTH CARE EDUCATION/TRAINING PROGRAM

## 2022-08-19 RX ORDER — MELATONIN 10 MG
1 CAPSULE ORAL NIGHTLY
COMMUNITY
End: 2023-07-19 | Stop reason: ALTCHOICE

## 2022-08-19 RX ORDER — ZOLPIDEM TARTRATE 10 MG/1
5 TABLET ORAL NIGHTLY PRN
COMMUNITY
End: 2022-09-15

## 2022-08-19 NOTE — ASSESSMENT & PLAN NOTE
A1c has now come to normal range in past year after big efforts from patient in terms of healthy eating - she is still having some issues with heart rate so has been difficult to exercise  Will recheck A1c but from my perspective it is safe to attempt pregnancy - she will need routine screening for GDM    She had a recent TSH by OBGYN which is optimal for pregnancy    Plan  - Continue lifestyle changes  - Recheck A1c, BMP today  - F/u 6 months - advised to notify if she gets pregnant

## 2022-08-19 NOTE — PROGRESS NOTES
Subjective:      Patient ID: Rissa Parker is a 26 y.o. female.    Chief Complaint:  Prediabetes      History of Present Illness  This is a 26 y.o. female. with a past medical history of anxiety, depression, BMI 35 who presented for evaluation of an elevated A1c.    Pprediabetes  Diagnosed in 2021  Reports weight was around 150 lb when she got COVID in late 2020, since then unable to exercise due to lung and heart issues   It seems like those are somewhat better but still struggling with HR going really high so she has an   Upcoming visit with [facility] - Dr. Currie  She has stopped her vaginal ring in early July in an attempt to get pregnant    She is on a prenatal and biotin      Lab Results   Component Value Date    HGBA1C 5.4 01/14/2022     1 mg DST - however noted to be on estrogen containing vaginal ring     10/14/2021    Cortisol, 8 AM 4.10 (L)      10/22/2021    Cortisol, 24H Ur 14     Lab Results   Component Value Date    CHOL 147 10/14/2021     Lab Results   Component Value Date    HDL 47 10/14/2021     Lab Results   Component Value Date    LDLCALC 88.4 10/14/2021     Lab Results   Component Value Date    TRIG 58 10/14/2021     Outside labs:  Jan 2021: A1c 5.9%    April 2021  A1c 5.5%  TSH 1.4  Thyroxine 8.1 (N)    Aug 2021  A1c 5.8%  Glucose 86  Cr 0.82  AST/ALT 22/18  LDL 79  Tg 97  HDL 49    June 2022  Glucose 90  TSH 1.7  Lipids ok, LDL 77      Mother - repots low A1c and maternal aunt hypoglycemia  Sister no issues with glucose  Father side of the family: 2 members with Prader-Willi syndrome    Weight:  Wt Readings from Last 5 Encounters:   08/19/22 92 kg (202 lb 13.2 oz)   06/30/22 94.3 kg (207 lb 14.3 oz)   05/19/22 96.2 kg (212 lb 1.3 oz)   03/23/22 94.3 kg (207 lb 14.3 oz)   03/10/22 93 kg (205 lb)           Review of Systems  As above    Social and family history reviewed  Current medications and allergies reviewed    Objective:   /64 (BP Location: Right arm, Patient Position: Sitting, BP  "Method: Medium (Manual))   Pulse 87   Resp 14   Ht 5' 5" (1.651 m)   Wt 92 kg (202 lb 13.2 oz)   BMI 33.75 kg/m²   Physical Exam   Alert, oriented    BP Readings from Last 1 Encounters:   08/19/22 118/64      Wt Readings from Last 1 Encounters:   08/19/22 0807 92 kg (202 lb 13.2 oz)     Body mass index is 33.75 kg/m².    Lab Review:   Lab Results   Component Value Date    HGBA1C 5.4 01/14/2022     Lab Results   Component Value Date    CHOL 147 10/14/2021    HDL 47 10/14/2021    LDLCALC 88.4 10/14/2021    TRIG 58 10/14/2021    CHOLHDL 32.0 10/14/2021     Lab Results   Component Value Date     10/14/2021    K 4.0 10/14/2021     (H) 10/14/2021    CO2 19 (L) 10/14/2021     (H) 10/14/2021    BUN 12 10/14/2021    CREATININE 0.8 10/14/2021    CALCIUM 9.8 10/14/2021    PROT 7.4 10/14/2021    ALBUMIN 3.6 10/14/2021    BILITOT 0.5 10/14/2021    ALKPHOS 77 10/14/2021    AST 20 10/14/2021    ALT 33 10/14/2021    ANIONGAP 10 10/14/2021    ESTGFRAFRICA >60 10/14/2021    EGFRNONAA >60 10/14/2021    TSH 0.790 10/14/2021       All pertinent labs reviewed    Assessment and Plan     Prediabetes  A1c has now come to normal range in past year after big efforts from patient in terms of healthy eating - she is still having some issues with heart rate so has been difficult to exercise  Will recheck A1c but from my perspective it is safe to attempt pregnancy - she will need routine screening for GDM    She had a recent TSH by OBGYN which is optimal for pregnancy    Plan  - Continue lifestyle changes  - Recheck A1c, BMP today  - F/u 6 months - advised to notify if she gets pregnant    BMI 33.0-33.9,adult  Continue lifestyle changes  Will see Cardiology for HR issues to be able to exercise more    JENNIFER (generalized anxiety disorder)  Reviewed recent TSH normal, thyroid not contributing to anxiety    F/u 6 months    Atif Hanna MD  Endocrinology          "

## 2022-09-09 ENCOUNTER — PATIENT MESSAGE (OUTPATIENT)
Dept: PSYCHIATRY | Facility: CLINIC | Age: 27
End: 2022-09-09
Payer: OTHER GOVERNMENT

## 2022-09-15 ENCOUNTER — OFFICE VISIT (OUTPATIENT)
Dept: PSYCHIATRY | Facility: CLINIC | Age: 27
End: 2022-09-15
Payer: OTHER GOVERNMENT

## 2022-09-15 VITALS
SYSTOLIC BLOOD PRESSURE: 102 MMHG | WEIGHT: 205.25 LBS | RESPIRATION RATE: 17 BRPM | HEART RATE: 101 BPM | BODY MASS INDEX: 34.2 KG/M2 | OXYGEN SATURATION: 99 % | DIASTOLIC BLOOD PRESSURE: 64 MMHG | HEIGHT: 65 IN

## 2022-09-15 DIAGNOSIS — F33.1 MODERATE EPISODE OF RECURRENT MAJOR DEPRESSIVE DISORDER: Primary | ICD-10-CM

## 2022-09-15 DIAGNOSIS — F41.1 GAD (GENERALIZED ANXIETY DISORDER): ICD-10-CM

## 2022-09-15 DIAGNOSIS — F41.0 PANIC ATTACKS: ICD-10-CM

## 2022-09-15 PROCEDURE — 99999 PR PBB SHADOW E&M-EST. PATIENT-LVL III: CPT | Mod: PBBFAC,,, | Performed by: STUDENT IN AN ORGANIZED HEALTH CARE EDUCATION/TRAINING PROGRAM

## 2022-09-15 PROCEDURE — 90833 PSYTX W PT W E/M 30 MIN: CPT | Mod: ,,, | Performed by: STUDENT IN AN ORGANIZED HEALTH CARE EDUCATION/TRAINING PROGRAM

## 2022-09-15 PROCEDURE — 99214 PR OFFICE/OUTPT VISIT, EST, LEVL IV, 30-39 MIN: ICD-10-PCS | Mod: S$PBB,,, | Performed by: STUDENT IN AN ORGANIZED HEALTH CARE EDUCATION/TRAINING PROGRAM

## 2022-09-15 PROCEDURE — 90833 PR PSYCHOTHERAPY W/PATIENT W/E&M, 30 MIN (ADD ON): ICD-10-PCS | Mod: ,,, | Performed by: STUDENT IN AN ORGANIZED HEALTH CARE EDUCATION/TRAINING PROGRAM

## 2022-09-15 PROCEDURE — 99999 PR PBB SHADOW E&M-EST. PATIENT-LVL III: ICD-10-PCS | Mod: PBBFAC,,, | Performed by: STUDENT IN AN ORGANIZED HEALTH CARE EDUCATION/TRAINING PROGRAM

## 2022-09-15 PROCEDURE — 99214 OFFICE O/P EST MOD 30 MIN: CPT | Mod: S$PBB,,, | Performed by: STUDENT IN AN ORGANIZED HEALTH CARE EDUCATION/TRAINING PROGRAM

## 2022-09-15 PROCEDURE — 99213 OFFICE O/P EST LOW 20 MIN: CPT | Mod: PBBFAC | Performed by: STUDENT IN AN ORGANIZED HEALTH CARE EDUCATION/TRAINING PROGRAM

## 2022-09-15 RX ORDER — BUPROPION HYDROCHLORIDE 150 MG/1
150 TABLET ORAL DAILY
Qty: 30 TABLET | Refills: 2 | Status: SHIPPED | OUTPATIENT
Start: 2022-09-15 | End: 2022-09-15 | Stop reason: SDUPTHER

## 2022-09-15 RX ORDER — BUPROPION HYDROCHLORIDE 150 MG/1
150 TABLET ORAL DAILY
Qty: 30 TABLET | Refills: 3 | Status: SHIPPED | OUTPATIENT
Start: 2022-09-15 | End: 2022-10-07 | Stop reason: SDUPTHER

## 2022-09-15 NOTE — PROGRESS NOTES
"            09/15/2022  4:20 PM  Rissa Parker  [id number]    Outpatient Psychiatry Follow-Up Visit (MD/NP)    9/15/2022    Clinical Status of Patient:  Outpatient (Ambulatory)    Chief Complaint:  Rissa Parker is a 27 y.o. female who presents today for follow-up of depression and anxiety.  Met with patient.        Interval History and Content of Current Session:  Interim Events/Subjective Report/Content of Current Session:   MDD, recurrent, severe  JENNIFER with panic attacks  Psychosocial stressors  Obesity  Insomnia        Reports mood has been "okay... manageable." She states anxiety has been "daily... some days I can get a hold of it, some days I can't.... I lot of worrying, a lot of what if's." No recent panic attacks.    Reports, "I feel overwhelmed... I have a lot going on at home."    Discussed stressor of medical issues.    She feels work is going okay.      Psychiatric Review Of Systems - Is patient experiencing or having changes in:  sleep: variable, "some days fine, some days suck"   appetite: "pretty good"  energy/anergy: "so so"  interest/pleasure/anhedonia: less  Anxiety: fluctuating   Panic: less  Guilty/hopelessness/worthlessness: yes, guilt   concentration: denies  S.I.B.s/risky behavior: no  Irritability: less  Substance abuse: no   Racing thoughts: no  Impulsive behaviors: no   Paranoia: no  AVH: no        Psychotherapy:  Target symptoms: depression, anxiety   Why chosen therapy is appropriate versus another modality: relevant to diagnosis  Outcome monitoring methods: self-report  Therapeutic intervention type: supportive psychotherapy  Topics discussed/themes:  educational, building skills sets for symptom management, symptom recognition, problem solving  The patient's response to the intervention is accepting. The patient's progress toward treatment goals is good.   Duration of intervention: 16 minutes.          Medical Review of Systems   Review of Systems   Constitutional:  Negative for " chills and fever.   HENT:  Negative for hearing loss.    Eyes:  Negative for blurred vision and double vision.   Respiratory:  Negative for shortness of breath.    Cardiovascular:  Negative for chest pain.   Gastrointestinal:  Negative for constipation, diarrhea, nausea and vomiting.   Genitourinary:  Negative for dysuria.   Musculoskeletal:  Negative for back pain and neck pain.   Skin:  Negative for rash.   Neurological:  Negative for dizziness and headaches.   Endo/Heme/Allergies:  Negative for environmental allergies.     Past Medical, Family and Social History: The patient's past medical, family and social history have been reviewed and updated as appropriate within the electronic medical record - see encounter notes.        Social History     Socioeconomic History    Marital status:    Tobacco Use    Smoking status: Never    Smokeless tobacco: Never   Substance and Sexual Activity    Alcohol use: Yes     Comment: occasionally         Compliance: yes    Side effects: None    Risk Parameters:  Patient reports no suicidal ideation  Patient reports no homicidal ideation  Patient reports no self-injurious behavior  Patient reports no violent behavior        Exam (detailed: at least 9 elements; comprehensive: all 15 elements)     Vitals:    09/15/22 1243   BP: 102/64   Pulse: 101   Resp: 17       Wt Readings from Last 4 Encounters:   09/15/22 93.1 kg (205 lb 4 oz)   08/19/22 92 kg (202 lb 13.2 oz)   06/30/22 94.3 kg (207 lb 14.3 oz)   05/19/22 96.2 kg (212 lb 1.3 oz)       Body mass index is 34.16 kg/m².      CONSTITUTIONAL  General Appearance: unremarkable, age appropriate    MUSCULOSKELETAL  Muscle Strength and Tone:no tremor, no tic  Abnormal Involuntary Movements: No  Gait and Station: non-ataxic    PSYCHIATRIC   Level of Consciousness: awake and alert   Orientation: person, place and situation  Grooming: Casually dressed and Well groomed  Psychomotor Behavior: normal, cooperative  Speech: normal tone,  "normal rate, normal pitch, normal volume  Language: grossly intact  Mood: "okay"  Affect: Consistent with mood  Thought Process: linear, logical  Associations: intact   Thought Content: DENIES suicidal ideation and DENIES homicidal ideation  Perceptions: denies hallucinations  Memory: Able to recall past events, Remote intact and Recent intact  Attention: Attends to interview without distraction  Fund of Knowledge: Aware of current events and Vocabulary appropriate   Estimate if Intelligence:  Average based on work/education history, vocabulary and mental status exam  Insight: has awareness of illness  Judgment: behavior is adequate to circumstances          Assessment and Diagnosis   Status/Progress: Based on the examination today, the patient's problem(s) is/are adequately but not ideally controlled.  New problems have not been presented today.   Co-morbidities are complicating management of the primary condition.          General Impression:      MDD, recurrent, severe  JENNIFER with panic attacks  Psychosocial stressors  Obesity     Insomnia     R/o PTSD              Plan:         MDD, recurrent, severe  - continue wellbutrin 150 mg PO qd (cleared by OB per patient, patient agrees B>R, category C risks extensively discussed)  - continue psychotherapy with Patricia Portillo  - pt counseled on diet, exercise, etc.  - taking LMF supplementation  - pt counseled        JENNIFER with panic attacks  - continue xanax 0.25 mg PO qd PRN (was effective in past per patient)  - continue psychotherapy with [facility]  - pt counseled on diet, exercise, etc.  - taking LMF supplementation  - pt counseled        Psychosocial stressors  - pt counseled  - continue psychotherapy with [facility]        Obesity  - avoid medications with high potential for serious weight gain (remeron, seroquel, etc.)       Insomnia   - pt counseled  - using CPAP now and sleeping better, feels more rested, no longer taking ambien        Elevated A1c  - referred to endocrine, note " reviewed          - Instructed patient to keep all scheduled appointments, take medications as prescribed and abstain from substance abuse. Instructed to call 911 or present to ER for emergency including SI or HI.    - Discussed diagnosis, risks and benefits of proposed treatment above vs alternative treatments vs no treatment, and potential side effects of these treatments. Discussed the inherent unpredictability of treatment. The patient expresses understanding of the above and displays the capacity to agree with this treatment given said understanding. Patient also agrees that, currently, the benefits outweigh the risks and would like to pursue this treatment at this time.         Reilly Infante III, MD    Return to Clinic: 1 month

## 2022-10-07 ENCOUNTER — PATIENT MESSAGE (OUTPATIENT)
Dept: ENDOCRINOLOGY | Facility: CLINIC | Age: 27
End: 2022-10-07
Payer: OTHER GOVERNMENT

## 2022-10-07 ENCOUNTER — OFFICE VISIT (OUTPATIENT)
Dept: PSYCHIATRY | Facility: CLINIC | Age: 27
End: 2022-10-07
Payer: OTHER GOVERNMENT

## 2022-10-07 VITALS
HEIGHT: 65 IN | WEIGHT: 204.81 LBS | BODY MASS INDEX: 34.12 KG/M2 | RESPIRATION RATE: 17 BRPM | SYSTOLIC BLOOD PRESSURE: 110 MMHG | HEART RATE: 94 BPM | DIASTOLIC BLOOD PRESSURE: 72 MMHG | OXYGEN SATURATION: 99 %

## 2022-10-07 DIAGNOSIS — F41.1 GAD (GENERALIZED ANXIETY DISORDER): ICD-10-CM

## 2022-10-07 DIAGNOSIS — Z65.8 PSYCHOSOCIAL STRESSORS: ICD-10-CM

## 2022-10-07 DIAGNOSIS — F33.1 MODERATE EPISODE OF RECURRENT MAJOR DEPRESSIVE DISORDER: Primary | ICD-10-CM

## 2022-10-07 DIAGNOSIS — F41.0 PANIC ATTACKS: ICD-10-CM

## 2022-10-07 PROCEDURE — 99999 PR PBB SHADOW E&M-EST. PATIENT-LVL III: ICD-10-PCS | Mod: PBBFAC,,, | Performed by: STUDENT IN AN ORGANIZED HEALTH CARE EDUCATION/TRAINING PROGRAM

## 2022-10-07 PROCEDURE — 99214 PR OFFICE/OUTPT VISIT, EST, LEVL IV, 30-39 MIN: ICD-10-PCS | Mod: S$PBB,,, | Performed by: STUDENT IN AN ORGANIZED HEALTH CARE EDUCATION/TRAINING PROGRAM

## 2022-10-07 PROCEDURE — 99999 PR PBB SHADOW E&M-EST. PATIENT-LVL III: CPT | Mod: PBBFAC,,, | Performed by: STUDENT IN AN ORGANIZED HEALTH CARE EDUCATION/TRAINING PROGRAM

## 2022-10-07 PROCEDURE — 90833 PR PSYCHOTHERAPY W/PATIENT W/E&M, 30 MIN (ADD ON): ICD-10-PCS | Mod: ,,, | Performed by: STUDENT IN AN ORGANIZED HEALTH CARE EDUCATION/TRAINING PROGRAM

## 2022-10-07 PROCEDURE — 90833 PSYTX W PT W E/M 30 MIN: CPT | Mod: ,,, | Performed by: STUDENT IN AN ORGANIZED HEALTH CARE EDUCATION/TRAINING PROGRAM

## 2022-10-07 PROCEDURE — 99213 OFFICE O/P EST LOW 20 MIN: CPT | Mod: PBBFAC | Performed by: STUDENT IN AN ORGANIZED HEALTH CARE EDUCATION/TRAINING PROGRAM

## 2022-10-07 PROCEDURE — 99214 OFFICE O/P EST MOD 30 MIN: CPT | Mod: S$PBB,,, | Performed by: STUDENT IN AN ORGANIZED HEALTH CARE EDUCATION/TRAINING PROGRAM

## 2022-10-07 RX ORDER — METOPROLOL SUCCINATE 25 MG/1
12.5 TABLET, EXTENDED RELEASE ORAL DAILY
COMMUNITY
Start: 2022-09-29

## 2022-10-07 RX ORDER — ALPRAZOLAM 0.25 MG/1
TABLET ORAL
COMMUNITY
Start: 2022-09-08 | End: 2022-10-07 | Stop reason: SDUPTHER

## 2022-10-07 RX ORDER — BUPROPION HYDROCHLORIDE 300 MG/1
300 TABLET ORAL DAILY
Qty: 30 TABLET | Refills: 3 | Status: SHIPPED | OUTPATIENT
Start: 2022-10-07 | End: 2022-12-12 | Stop reason: SDUPTHER

## 2022-10-07 NOTE — PROGRESS NOTES
"            10/07/2022  4:20 PM  Rissa Parker  [id number]    Outpatient Psychiatry Follow-Up Visit (MD/NP)    10/7/2022    Clinical Status of Patient:  Outpatient (Ambulatory)    Chief Complaint:  Rissa Parekr is a 27 y.o. female who presents today for follow-up of depression and anxiety.  Met with patient.        Interval History and Content of Current Session:  Interim Events/Subjective Report/Content of Current Session:   MDD, recurrent, severe  JENNIFER with panic attacks  Psychosocial stressors  Obesity  Insomnia        Reports "I'm really tired... I'm working a lot, it seems like no amount of rest is enough... my head feels hot, like I need a nap, I'm miserable, don't want to be around anyone." JENNIFER symptoms "sometimes, when I'm sitting at the end of the day, I'm trying to relax, it snowballs."    Reports one recent panic attack, "not terrible, not one of the worst one," took xanax which helped, "made me able to function."    Discussed stress management and self care.      Psychiatric Review Of Systems - Is patient experiencing or having changes in:  sleep: variable, "not the best"  appetite: normal  energy/anergy: "down"  interest/pleasure/anhedonia: yes  Anxiety: fluctuating   Panic: less  Guilty/hopelessness/worthlessness: yes, guilt, "a lot of stuff going on, financial concerns... I feel like I should have it all under control"  concentration: "sometimes"  S.I.B.s/risky behavior: no  Irritability: yes  Substance abuse: drinks alcohol a few nights per week, 2 glasses   Racing thoughts: no  Impulsive behaviors: no   Paranoia: no  AVH: no        Psychotherapy:  Target symptoms: depression, anxiety   Why chosen therapy is appropriate versus another modality: relevant to diagnosis  Outcome monitoring methods: self-report  Therapeutic intervention type: supportive psychotherapy  Topics discussed/themes: building skills sets for symptom management, symptom recognition,  The patient's response to the " [address] [address] 
[phone] Patient: Mel Ferreira MRN:  [id number] Visit Information Date & Time Provider Department Dept. Phone Encounter #  
 7/9/2018  8:30 AM Harley Ding MD [address] [facility] [phone] [id number] Follow-up Instructions Return if symptoms worsen or fail to improve. Upcoming Health Maintenance Date Due Pneumococcal 19-64 Medium Risk (1 of 1 - PPSV23) 3/26/1976 DTaP/Tdap/Td series (1 - Tdap) 3/26/1978 ZOSTER VACCINE AGE 60> 1/26/2017 Influenza Age 5 to Adult 8/1/2018 COLONOSCOPY 12/6/2022 Allergies as of 7/9/2018  Review Complete On: 7/9/2018 By: Harley Ding MD  
  
 Severity Noted Reaction Type Reactions Crestor [Rosuvastatin]  08/31/2016    Myalgia Lipitor [Atorvastatin]  08/20/2013    Myalgia Mucinex [Guaifenesin]  08/23/2017    Cough Pcn [Penicillins]  03/30/2010    Hives Current Immunizations  Reviewed on 8/31/2016 Name Date Influenza Vaccine Shukri Goring) 10/23/2017 Influenza Vaccine Split 10/22/2012, 11/30/2011 Not reviewed this visit You Were Diagnosed With   
  
 Codes Comments Routine general medical examination at a health care facility    -  Primary ICD-10-CM: Z00.00 ICD-9-CM: V70.0 Essential hypertension     ICD-10-CM: I10 
ICD-9-CM: 401.9 Pure hypercholesterolemia     ICD-10-CM: E78.00 ICD-9-CM: 272.0 Vitals BP Pulse Temp Resp Height(growth percentile) Weight(growth percentile) 138/81 73 98 °F (36.7 °C) 20 6' 4\" (1.93 m) 258 lb (117 kg) SpO2 BMI Smoking Status 95% 31.4 kg/m2 Former Smoker Vitals History BMI and BSA Data Body Mass Index Body Surface Area  
 31.4 kg/m 2 2.5 m 2 Preferred Pharmacy Pharmacy Name Phone [id number] [place], [address] [phone] Your Updated Medication List  
  
   
This list is accurate as of 7/9/18  9:04 AM.  Always use your most recent med list.  
  
  
  
  
 ALEVE 220 mg tablet Generic drug:  naproxen sodium Take 220 mg by mouth two (2) times daily (with meals). azithromycin 250 mg tablet Commonly known as:  Arlina Lever Take two tablets today then one tablet daily  
  
 fluticasone 50 mcg/actuation nasal spray Commonly known as:  FLONASE  
USE 2 SPRAYS IN EACH NOSTRIL DAILY  
  
 lisinopril-hydroCHLOROthiazide 20-12.5 mg per tablet Commonly known as:  PRINZIDE, ZESTORETIC  
TAKE 1 TABLET BY MOUTH DAILY  
  
 omega 3-DHA-EPA-fish oil 1,000 mg (120 mg-180 mg) capsule Take 1 Cap by mouth daily. Prescriptions Printed Refills  
 azithromycin (ZITHROMAX) 250 mg tablet 0 Sig: Take two tablets today then one tablet daily Class: Print We Performed the Following CBC WITH AUTOMATED DIFF [85822 CPT(R)] LIPID PANEL [32280 CPT(R)] METABOLIC PANEL, COMPREHENSIVE [75425 CPT(R)] PSA, DIAGNOSTIC (PROSTATE SPECIFIC AG) [id number] CPT(R)] TSH 3RD GENERATION [30544 CPT(R)] Follow-up Instructions Return if symptoms worsen or fail to improve. Introducing [facility]! [employer] introduces [employer] patient portal. Now you can access parts of your medical record, email your doctor's office, and request medication refills online. 1. In your internet browser, go to [email or web address] 2. Click on the First Time User? Click Here link in the Sign In box. You will see the New Member Sign Up page. 3. Enter your [employer] Access Code exactly as it appears below. You will not need to use this code after youve completed the sign-up process. If you do not sign up before the expiration date, you must request a new code. · [employer] Access Code: [email or web address] Expires: 10/7/2018  9:04 AM 
 
 intervention is accepting. The patient's progress toward treatment goals is good.   Duration of intervention: 16 minutes.          Medical Review of Systems   Review of Systems   Constitutional:  Negative for chills and fever.   HENT:  Negative for hearing loss.    Eyes:  Negative for blurred vision and double vision.   Respiratory:  Negative for shortness of breath.    Cardiovascular:  Negative for chest pain.   Gastrointestinal:  Negative for constipation, diarrhea, nausea and vomiting.   Genitourinary:  Negative for dysuria.   Musculoskeletal:  Negative for back pain and neck pain.   Skin:  Negative for rash.   Neurological:  Negative for dizziness and headaches.   Endo/Heme/Allergies:  Negative for environmental allergies.           Past Medical, Family and Social History: The patient's past medical, family and social history have been reviewed and updated as appropriate within the electronic medical record - see encounter notes.        Social History     Socioeconomic History    Marital status:    Tobacco Use    Smoking status: Never    Smokeless tobacco: Never   Substance and Sexual Activity    Alcohol use: Yes     Comment: occasionally         Compliance: yes    Side effects: None    Risk Parameters:  Patient reports no suicidal ideation  Patient reports no homicidal ideation  Patient reports no self-injurious behavior  Patient reports no violent behavior        Exam (detailed: at least 9 elements; comprehensive: all 15 elements)     Vitals:    10/07/22 1401   BP: 110/72   Pulse: 94   Resp: 17       Body mass index is 34.08 kg/m².            CONSTITUTIONAL  General Appearance: unremarkable, age appropriate    MUSCULOSKELETAL  Muscle Strength and Tone:no tremor, no tic  Abnormal Involuntary Movements: No  Gait and Station: non-ataxic    PSYCHIATRIC   Level of Consciousness: awake and alert   Orientation: person, place and situation  Grooming: Casually dressed and Well groomed  Psychomotor Behavior:  "normal, cooperative  Speech: normal tone, normal rate, normal pitch, normal volume  Language: grossly intact  Mood: "okay"  Affect: Consistent with mood  Thought Process: linear, logical  Associations: intact   Thought Content: DENIES suicidal ideation and DENIES homicidal ideation  Perceptions: denies hallucinations  Memory: Able to recall past events, Remote intact and Recent intact  Attention: Attends to interview without distraction  Fund of Knowledge: Aware of current events and Vocabulary appropriate   Estimate if Intelligence:  Average based on work/education history, vocabulary and mental status exam  Insight: has awareness of illness  Judgment: behavior is adequate to circumstances          Assessment and Diagnosis   Status/Progress: Based on the examination today, the patient's problem(s) is/are adequately but not ideally controlled.  New problems have not been presented today.   Co-morbidities are complicating management of the primary condition.          General Impression:      MDD, recurrent, severe  JENNIFER with panic attacks  Psychosocial stressors  Obesity     Insomnia     R/o PTSD              Plan:         MDD, recurrent, severe  - increase wellbutrin 150 to 300  mg PO qd (cleared by OB per patient, patient agrees B>R, category C risks extensively discussed)  - continue psychotherapy with Patricia Portillo  - pt counseled on diet, exercise, etc.  - taking LMF supplementation  - pt counseled        JENNIFER with panic attacks  - continue xanax 0.25 mg PO qd PRN (was effective in past per patient)  - continue psychotherapy with [facility]  - pt counseled on diet, exercise, etc.  - taking LMF supplementation  - pt counseled        Psychosocial stressors  - pt counseled  - continue psychotherapy with [facility]        Obesity  - avoid medications with high potential for serious weight gain (remeron, seroquel, etc.)       Insomnia   - pt counseled  - using CPAP now and sleeping better, feels more rested, no longer taking " 4. Enter the last four digits of your Social Security Number (xxxx) and Date of Birth (mm/dd/yyyy) as indicated and click Submit. You will be taken to the next sign-up page. 5. Create a Balloon ID. This will be your Balloon login ID and cannot be changed, so think of one that is secure and easy to remember. 6. Create a Balloon password. You can change your password at any time. 7. Enter your Password Reset Question and Answer. This can be used at a later time if you forget your password. 8. Enter your e-mail address. You will receive e-mail notification when new information is available in [address] 9. Click Sign Up. You can now view and download portions of your medical record. 10. Click the Download Summary menu link to download a portable copy of your medical information. If you have questions, please visit the Frequently Asked Questions section of the [employer] website. Remember, [employer] is NOT to be used for urgent needs. For medical emergencies, dial 911. Now available from your iPhone and Android! Please provide this summary of care documentation to your next provider. Your primary care clinician is listed as [facility]. If you have any questions after today's visit, please call [phone]. ambien        Elevated A1c  - referred to endocrine, note reviewed          - Instructed patient to keep all scheduled appointments, take medications as prescribed and abstain from substance abuse. Instructed to call 911 or present to ER for emergency including SI or HI.    - Discussed diagnosis, risks and benefits of proposed treatment above vs alternative treatments vs no treatment, and potential side effects of these treatments. Discussed the inherent unpredictability of treatment. The patient expresses understanding of the above and displays the capacity to agree with this treatment given said understanding. Patient also agrees that, currently, the benefits outweigh the risks and would like to pursue this treatment at this time.         Reilly Infante III, MD    Return to Clinic: 1 month

## 2022-10-13 ENCOUNTER — LAB VISIT (OUTPATIENT)
Dept: LAB | Facility: HOSPITAL | Age: 27
End: 2022-10-13
Attending: STUDENT IN AN ORGANIZED HEALTH CARE EDUCATION/TRAINING PROGRAM
Payer: OTHER GOVERNMENT

## 2022-10-13 ENCOUNTER — PATIENT MESSAGE (OUTPATIENT)
Dept: ENDOCRINOLOGY | Facility: CLINIC | Age: 27
End: 2022-10-13
Payer: OTHER GOVERNMENT

## 2022-10-13 LAB
T4 FREE SERPL-MCNC: 0.83 NG/DL (ref 0.71–1.51)
TSH SERPL DL<=0.005 MIU/L-ACNC: 1.27 UIU/ML (ref 0.4–4)

## 2022-10-13 PROCEDURE — 84443 ASSAY THYROID STIM HORMONE: CPT | Performed by: STUDENT IN AN ORGANIZED HEALTH CARE EDUCATION/TRAINING PROGRAM

## 2022-10-13 PROCEDURE — 36415 COLL VENOUS BLD VENIPUNCTURE: CPT | Performed by: STUDENT IN AN ORGANIZED HEALTH CARE EDUCATION/TRAINING PROGRAM

## 2022-10-13 PROCEDURE — 84439 ASSAY OF FREE THYROXINE: CPT | Performed by: STUDENT IN AN ORGANIZED HEALTH CARE EDUCATION/TRAINING PROGRAM

## 2022-12-12 ENCOUNTER — OFFICE VISIT (OUTPATIENT)
Dept: PSYCHIATRY | Facility: CLINIC | Age: 27
End: 2022-12-12
Payer: OTHER GOVERNMENT

## 2022-12-12 VITALS
BODY MASS INDEX: 33.55 KG/M2 | RESPIRATION RATE: 17 BRPM | DIASTOLIC BLOOD PRESSURE: 64 MMHG | SYSTOLIC BLOOD PRESSURE: 101 MMHG | HEIGHT: 65 IN | WEIGHT: 201.38 LBS | HEART RATE: 92 BPM | OXYGEN SATURATION: 98 %

## 2022-12-12 DIAGNOSIS — F33.1 MODERATE EPISODE OF RECURRENT MAJOR DEPRESSIVE DISORDER: Primary | ICD-10-CM

## 2022-12-12 DIAGNOSIS — F41.1 GAD (GENERALIZED ANXIETY DISORDER): ICD-10-CM

## 2022-12-12 DIAGNOSIS — F41.0 PANIC ATTACKS: ICD-10-CM

## 2022-12-12 DIAGNOSIS — Z65.8 PSYCHOSOCIAL STRESSORS: ICD-10-CM

## 2022-12-12 PROCEDURE — 99214 OFFICE O/P EST MOD 30 MIN: CPT | Mod: S$PBB,,, | Performed by: STUDENT IN AN ORGANIZED HEALTH CARE EDUCATION/TRAINING PROGRAM

## 2022-12-12 PROCEDURE — 99999 PR PBB SHADOW E&M-EST. PATIENT-LVL III: ICD-10-PCS | Mod: PBBFAC,,, | Performed by: STUDENT IN AN ORGANIZED HEALTH CARE EDUCATION/TRAINING PROGRAM

## 2022-12-12 PROCEDURE — 99213 OFFICE O/P EST LOW 20 MIN: CPT | Mod: PBBFAC | Performed by: STUDENT IN AN ORGANIZED HEALTH CARE EDUCATION/TRAINING PROGRAM

## 2022-12-12 PROCEDURE — 99999 PR PBB SHADOW E&M-EST. PATIENT-LVL III: CPT | Mod: PBBFAC,,, | Performed by: STUDENT IN AN ORGANIZED HEALTH CARE EDUCATION/TRAINING PROGRAM

## 2022-12-12 PROCEDURE — 99214 PR OFFICE/OUTPT VISIT, EST, LEVL IV, 30-39 MIN: ICD-10-PCS | Mod: S$PBB,,, | Performed by: STUDENT IN AN ORGANIZED HEALTH CARE EDUCATION/TRAINING PROGRAM

## 2022-12-12 RX ORDER — BUPROPION HYDROCHLORIDE 300 MG/1
300 TABLET ORAL DAILY
Qty: 30 TABLET | Refills: 3 | Status: SHIPPED | OUTPATIENT
Start: 2022-12-12 | End: 2023-04-03 | Stop reason: SDUPTHER

## 2022-12-12 NOTE — PROGRESS NOTES
"              12/12/2022  4:20 PM  Rissa Parker  [id number]    Outpatient Psychiatry Follow-Up Visit (MD/NP)    12/12/2022    Clinical Status of Patient:  Outpatient (Ambulatory)    Chief Complaint:  Rissa Parker is a 27 y.o. female who presents today for follow-up of depression and anxiety.  Met with patient.        Interval History and Content of Current Session:  Interim Events/Subjective Report/Content of Current Session:   MDD, recurrent, severe  JENNIFER with panic attacks  Psychosocial stressors  Obesity  Insomnia        She states she sold her house. Her grandfather passed away. She states she is feeling "more level, it's  been go go go, do do do, it's getting less hectic, so it's alright, I don't have much depression symptoms." Reports JENNIFER, "not all the time, just sometimes I worry excessively, but it's not terrible," occurs 2-3 days per week. No recent panic attacks, takes xanax rarely.        Psychiatric Review Of Systems - Is patient experiencing or having changes in:  sleep: "pretty good," taking "cold medicine"  appetite: "a little less" still has cravings  energy/anergy: "the same"  interest/pleasure/anhedonia: variable   Anxiety: fluctuating   Panic: less  Guilty/hopelessness/worthlessness: less  concentration: "sometimes"  S.I.B.s/risky behavior: no  Irritability: "sometimes"  Substance abuse: occasional glass of wine  Racing thoughts: no  Impulsive behaviors: no   Paranoia: no  AVH: no            Medical Review of Systems   Review of Systems   Constitutional:  Negative for chills and fever.   HENT:  Negative for hearing loss.    Eyes:  Negative for blurred vision and double vision.   Respiratory:  Negative for shortness of breath.    Cardiovascular:  Negative for chest pain.   Gastrointestinal:  Negative for constipation, diarrhea, nausea and vomiting.   Genitourinary:  Negative for dysuria.   Musculoskeletal:  Negative for back pain and neck pain.   Skin:  Negative for rash.   Neurological:  " "Negative for dizziness and headaches.   Endo/Heme/Allergies:  Negative for environmental allergies.           Past Medical, Family and Social History: The patient's past medical, family and social history have been reviewed and updated as appropriate within the electronic medical record - see encounter notes.        Social History     Socioeconomic History    Marital status:    Tobacco Use    Smoking status: Never    Smokeless tobacco: Never   Substance and Sexual Activity    Alcohol use: Yes     Comment: occasionally         Compliance: yes    Side effects: None    Risk Parameters:  Patient reports no suicidal ideation  Patient reports no homicidal ideation  Patient reports no self-injurious behavior  Patient reports no violent behavior        Exam (detailed: at least 9 elements; comprehensive: all 15 elements)     Vitals:    12/12/22 1303   BP: 101/64   Pulse: 92   Resp: 17       Wt Readings from Last 4 Encounters:   12/12/22 91.3 kg (201 lb 6.2 oz)   10/07/22 92.9 kg (204 lb 12.9 oz)   09/15/22 93.1 kg (205 lb 4 oz)   08/19/22 92 kg (202 lb 13.2 oz)       Body mass index is 33.51 kg/m².          CONSTITUTIONAL  General Appearance: unremarkable, age appropriate    MUSCULOSKELETAL  Muscle Strength and Tone:no tremor, no tic  Abnormal Involuntary Movements: No  Gait and Station: non-ataxic    PSYCHIATRIC   Level of Consciousness: awake and alert   Orientation: person, place and situation  Grooming: Casually dressed and Well groomed  Psychomotor Behavior: normal, cooperative  Speech: normal tone, normal rate, normal pitch, normal volume  Language: grossly intact  Mood: "okay"  Affect: Consistent with mood  Thought Process: linear, logical  Associations: intact   Thought Content: DENIES suicidal ideation and DENIES homicidal ideation  Perceptions: denies hallucinations  Memory: Able to recall past events, Remote intact and Recent intact  Attention: Attends to interview without distraction  Fund of Knowledge: " Aware of current events and Vocabulary appropriate   Estimate if Intelligence:  Average based on work/education history, vocabulary and mental status exam  Insight: has awareness of illness  Judgment: behavior is adequate to circumstances          Assessment and Diagnosis   Status/Progress: Based on the examination today, the patient's problem(s) is/are adequately but not ideally controlled.  New problems have not been presented today.   Co-morbidities are complicating management of the primary condition.          General Impression:      MDD, recurrent, severe  JENNIFER with panic attacks  Psychosocial stressors  Obesity     Insomnia     R/o PTSD              Plan:         MDD, recurrent, severe  - continue wellbutrin 300 mg PO qd  - continue psychotherapy with Patricia Portillo  - pt counseled on diet, exercise, etc.  - taking LMF supplementation  - pt counseled        JENNIFER with panic attacks  - continue xanax 0.25 mg PO qd PRN  - continue psychotherapy with [facility]  - pt counseled on diet, exercise, etc.  - taking LMF supplementation  - pt counseled        Psychosocial stressors  - pt counseled  - continue psychotherapy with [facility]        Obesity  - avoid medications with high potential for serious weight gain (remeron, seroquel, etc.)       Insomnia   - pt counseled  - using CPAP now and sleeping better, feels more rested, no longer taking ambien        Elevated A1c  - referred to endocrine, note reviewed          - Instructed patient to keep all scheduled appointments, take medications as prescribed and abstain from substance abuse. Instructed to call 911 or present to ER for emergency including SI or HI.    - Discussed diagnosis, risks and benefits of proposed treatment above vs alternative treatments vs no treatment, and potential side effects of these treatments. Discussed the inherent unpredictability of treatment. The patient expresses understanding of the above and displays the capacity to agree with this treatment given  said understanding. Patient also agrees that, currently, the benefits outweigh the risks and would like to pursue this treatment at this time.         Reilly Infante III, MD    Return to Clinic: 3 months

## 2023-02-15 ENCOUNTER — CLINICAL SUPPORT (OUTPATIENT)
Dept: OTHER | Facility: CLINIC | Age: 28
End: 2023-02-15

## 2023-02-15 DIAGNOSIS — Z00.8 ENCOUNTER FOR OTHER GENERAL EXAMINATION: ICD-10-CM

## 2023-02-16 VITALS
SYSTOLIC BLOOD PRESSURE: 106 MMHG | DIASTOLIC BLOOD PRESSURE: 72 MMHG | BODY MASS INDEX: 33.32 KG/M2 | WEIGHT: 200 LBS | HEIGHT: 65 IN

## 2023-02-16 LAB
HDLC SERPL-MCNC: 59 MG/DL
POC CHOLESTEROL, LDL (DOCK): 79 MG/DL
POC CHOLESTEROL, TOTAL: 155 MG/DL
POC GLUCOSE, FASTING: 98 MG/DL (ref 60–110)
TRIGL SERPL-MCNC: 95 MG/DL

## 2023-02-20 ENCOUNTER — OFFICE VISIT (OUTPATIENT)
Dept: ENDOCRINOLOGY | Facility: CLINIC | Age: 28
End: 2023-02-20
Payer: OTHER GOVERNMENT

## 2023-02-20 ENCOUNTER — LAB VISIT (OUTPATIENT)
Dept: LAB | Facility: HOSPITAL | Age: 28
End: 2023-02-20
Attending: STUDENT IN AN ORGANIZED HEALTH CARE EDUCATION/TRAINING PROGRAM
Payer: OTHER GOVERNMENT

## 2023-02-20 ENCOUNTER — PATIENT MESSAGE (OUTPATIENT)
Dept: ENDOCRINOLOGY | Facility: CLINIC | Age: 28
End: 2023-02-20

## 2023-02-20 VITALS
HEIGHT: 65 IN | HEART RATE: 82 BPM | RESPIRATION RATE: 16 BRPM | WEIGHT: 202.81 LBS | BODY MASS INDEX: 33.79 KG/M2 | DIASTOLIC BLOOD PRESSURE: 68 MMHG | SYSTOLIC BLOOD PRESSURE: 126 MMHG

## 2023-02-20 DIAGNOSIS — N97.0 ANOVULATION: ICD-10-CM

## 2023-02-20 DIAGNOSIS — R73.03 PREDIABETES: ICD-10-CM

## 2023-02-20 DIAGNOSIS — R73.03 PREDIABETES: Primary | ICD-10-CM

## 2023-02-20 DIAGNOSIS — E11.65 TYPE 2 DIABETES MELLITUS WITH HYPERGLYCEMIA, WITHOUT LONG-TERM CURRENT USE OF INSULIN: ICD-10-CM

## 2023-02-20 LAB
ANION GAP SERPL CALC-SCNC: 10 MMOL/L (ref 8–16)
BUN SERPL-MCNC: 15 MG/DL (ref 6–20)
CALCIUM SERPL-MCNC: 9.9 MG/DL (ref 8.7–10.5)
CHLORIDE SERPL-SCNC: 105 MMOL/L (ref 95–110)
CHOLEST SERPL-MCNC: 162 MG/DL (ref 120–199)
CHOLEST/HDLC SERPL: 2.7 {RATIO} (ref 2–5)
CO2 SERPL-SCNC: 26 MMOL/L (ref 23–29)
CREAT SERPL-MCNC: 0.9 MG/DL (ref 0.5–1.4)
EST. GFR  (NO RACE VARIABLE): >60 ML/MIN/1.73 M^2
ESTIMATED AVG GLUCOSE: 100 MG/DL (ref 68–131)
GLUCOSE SERPL-MCNC: 96 MG/DL (ref 70–110)
HBA1C MFR BLD: 5.1 % (ref 4–5.6)
HDLC SERPL-MCNC: 60 MG/DL (ref 40–75)
HDLC SERPL: 37 % (ref 20–50)
INSULIN COLLECTION INTERVAL: NORMAL
INSULIN SERPL-ACNC: 17.1 UU/ML
LDLC SERPL CALC-MCNC: 88 MG/DL (ref 63–159)
NONHDLC SERPL-MCNC: 102 MG/DL
POTASSIUM SERPL-SCNC: 4.2 MMOL/L (ref 3.5–5.1)
SODIUM SERPL-SCNC: 141 MMOL/L (ref 136–145)
TRIGL SERPL-MCNC: 70 MG/DL (ref 30–150)
TSH SERPL DL<=0.005 MIU/L-ACNC: 1.13 UIU/ML (ref 0.4–4)

## 2023-02-20 PROCEDURE — 83525 ASSAY OF INSULIN: CPT | Performed by: STUDENT IN AN ORGANIZED HEALTH CARE EDUCATION/TRAINING PROGRAM

## 2023-02-20 PROCEDURE — 80048 BASIC METABOLIC PNL TOTAL CA: CPT | Performed by: STUDENT IN AN ORGANIZED HEALTH CARE EDUCATION/TRAINING PROGRAM

## 2023-02-20 PROCEDURE — 99214 OFFICE O/P EST MOD 30 MIN: CPT | Mod: S$PBB,,, | Performed by: STUDENT IN AN ORGANIZED HEALTH CARE EDUCATION/TRAINING PROGRAM

## 2023-02-20 PROCEDURE — 84443 ASSAY THYROID STIM HORMONE: CPT | Performed by: STUDENT IN AN ORGANIZED HEALTH CARE EDUCATION/TRAINING PROGRAM

## 2023-02-20 PROCEDURE — 83036 HEMOGLOBIN GLYCOSYLATED A1C: CPT | Performed by: STUDENT IN AN ORGANIZED HEALTH CARE EDUCATION/TRAINING PROGRAM

## 2023-02-20 PROCEDURE — 80061 LIPID PANEL: CPT | Performed by: STUDENT IN AN ORGANIZED HEALTH CARE EDUCATION/TRAINING PROGRAM

## 2023-02-20 PROCEDURE — 99999 PR PBB SHADOW E&M-EST. PATIENT-LVL IV: CPT | Mod: PBBFAC,,, | Performed by: STUDENT IN AN ORGANIZED HEALTH CARE EDUCATION/TRAINING PROGRAM

## 2023-02-20 PROCEDURE — 99214 OFFICE O/P EST MOD 30 MIN: CPT | Mod: PBBFAC | Performed by: STUDENT IN AN ORGANIZED HEALTH CARE EDUCATION/TRAINING PROGRAM

## 2023-02-20 PROCEDURE — 99214 PR OFFICE/OUTPT VISIT, EST, LEVL IV, 30-39 MIN: ICD-10-PCS | Mod: S$PBB,,, | Performed by: STUDENT IN AN ORGANIZED HEALTH CARE EDUCATION/TRAINING PROGRAM

## 2023-02-20 PROCEDURE — 99999 PR PBB SHADOW E&M-EST. PATIENT-LVL IV: ICD-10-PCS | Mod: PBBFAC,,, | Performed by: STUDENT IN AN ORGANIZED HEALTH CARE EDUCATION/TRAINING PROGRAM

## 2023-02-20 PROCEDURE — 36415 COLL VENOUS BLD VENIPUNCTURE: CPT | Performed by: STUDENT IN AN ORGANIZED HEALTH CARE EDUCATION/TRAINING PROGRAM

## 2023-02-20 RX ORDER — CLINDAMYCIN PHOSPHATE 11.9 MG/ML
SOLUTION TOPICAL
COMMUNITY
Start: 2023-02-15

## 2023-02-20 RX ORDER — CHOLECALCIFEROL (VITAMIN D3) 50 MCG
TABLET ORAL
COMMUNITY
Start: 2022-04-14

## 2023-02-20 NOTE — ASSESSMENT & PLAN NOTE
A1c at goal  Benefits from weight loss - will start GLP-1 RA  If get pregnant need to stop GLP-1 RA - explained that GLP-1 [place] have not been associated with negative pregnancy outcomes- they just have not been tested long enough    Plan  - Continue lifestyle changes  - Start Ozempic 0.25 mg weekly, increase to 0.5 mg weekly after 2 weeks if tolerated  - Recheck A1c, BMP today  - F/u 6 months - advised to notify if she gets pregnant

## 2023-02-20 NOTE — PROGRESS NOTES
Subjective:      Patient ID: Rissa Parker is a 27 y.o. female.    Chief Complaint:  Prediabetes      History of Present Illness  This is a 26 y.o. female. with a past medical history of anxiety, depression, BMI 33 here for follow up of prediabetes.    Pprediabetes  Diagnosed in 2021  Reports weight was around 150 lb when she got COVID in late 2020, since then unable to exercise due to lung and heart issues   She has stopped her vaginal ring in early July in an attempt to get pregnant    She saw Cardiology - put on low dose metoprolol for tachycardia    She walks wand does some resistance training 2-3 times per week    She is on a prenatal     She has not lost weight      Lab Results   Component Value Date    HGBA1C 5.2 08/19/2022     1 mg DST - however noted to be on estrogen containing vaginal ring     10/14/2021    Cortisol, 8 AM 4.10 (L)      10/22/2021    Cortisol, 24H Ur 14     Lab Results   Component Value Date    CHOL 162 02/20/2023    CHOL 147 10/14/2021     Lab Results   Component Value Date    HDL 60 02/20/2023    HDL 47 10/14/2021     Lab Results   Component Value Date    LDLCALC 88.0 02/20/2023    LDLCALC 88.4 10/14/2021     Lab Results   Component Value Date    TRIG 70 02/20/2023    TRIG 58 10/14/2021     Outside labs:  Jan 2021: A1c 5.9%    April 2021  A1c 5.5%  TSH 1.4  Thyroxine 8.1 (N)    Aug 2021  A1c 5.8%  Glucose 86  Cr 0.82  AST/ALT 22/18  LDL 79  Tg 97  HDL 49    June 2022  Glucose 90  TSH 1.7  Lipids ok, LDL 77      Mother - repots low A1c and maternal aunt hypoglycemia  Sister no issues with glucose  Father side of the family: 2 members with Prader-Willi syndrome    Weight:  Wt Readings from Last 6 Encounters:   02/20/23 92 kg (202 lb 13.2 oz)   02/15/23 90.7 kg (200 lb)   08/19/22 92 kg (202 lb 13.2 oz)   03/10/22 93 kg (205 lb)   01/14/22 94.1 kg (207 lb 7.3 oz)   12/22/21 97 kg (213 lb 12.8 oz)             Review of Systems  As above    Social and family history reviewed  Current  "medications and allergies reviewed    Objective:   /68 (BP Location: Right arm, Patient Position: Sitting, BP Method: Medium (Manual))   Pulse 82   Resp 16   Ht 5' 5" (1.651 m)   Wt 92 kg (202 lb 13.2 oz)   BMI 33.75 kg/m²   Physical Exam   Alert, oriented    BP Readings from Last 1 Encounters:   02/20/23 126/68      Wt Readings from Last 1 Encounters:   02/20/23 0753 92 kg (202 lb 13.2 oz)     Body mass index is 33.75 kg/m².    Lab Review:   Lab Results   Component Value Date    HGBA1C 5.2 08/19/2022     Lab Results   Component Value Date    CHOL 162 02/20/2023    HDL 60 02/20/2023    LDLCALC 88.0 02/20/2023    TRIG 70 02/20/2023    CHOLHDL 37.0 02/20/2023     Lab Results   Component Value Date     02/20/2023    K 4.2 02/20/2023     02/20/2023    CO2 26 02/20/2023    GLU 96 02/20/2023    BUN 15 02/20/2023    CREATININE 0.9 02/20/2023    CALCIUM 9.9 02/20/2023    PROT 7.4 10/14/2021    ALBUMIN 3.6 10/14/2021    BILITOT 0.5 10/14/2021    ALKPHOS 77 10/14/2021    AST 20 10/14/2021    ALT 33 10/14/2021    ANIONGAP 10 02/20/2023    ESTGFRAFRICA >60 10/14/2021    EGFRNONAA >60 10/14/2021    TSH 1.274 10/13/2022       All pertinent labs reviewed    Assessment and Plan     Prediabetes  A1c at goal  Benefits from weight loss - will start GLP-1 RA  If get pregnant need to stop GLP-1 RA - explained that GLP-1 [place] have not been associated with negative pregnancy outcomes- they just have not been tested long enough    Plan  - Continue lifestyle changes  - Start Ozempic 0.25 mg weekly, increase to 0.5 mg weekly after 2 weeks if tolerated  - Recheck A1c, BMP today  - F/u 6 months - advised to notify if she gets pregnant    BMI 33.0-33.9,adult  Start GLP-1 RA    Anovulation  Reports work up by OBGYN  Believe BMI contributing - will start GLP-1 RA as above    F/u 6 months    Atif Hanna MD  Endocrinology            "

## 2023-04-03 ENCOUNTER — OFFICE VISIT (OUTPATIENT)
Dept: PSYCHIATRY | Facility: CLINIC | Age: 28
End: 2023-04-03
Payer: OTHER GOVERNMENT

## 2023-04-03 VITALS
BODY MASS INDEX: 32.29 KG/M2 | DIASTOLIC BLOOD PRESSURE: 62 MMHG | SYSTOLIC BLOOD PRESSURE: 104 MMHG | RESPIRATION RATE: 17 BRPM | HEART RATE: 89 BPM | WEIGHT: 193.81 LBS | HEIGHT: 65 IN | OXYGEN SATURATION: 99 %

## 2023-04-03 DIAGNOSIS — Z65.8 PSYCHOSOCIAL STRESSORS: ICD-10-CM

## 2023-04-03 DIAGNOSIS — F41.0 PANIC ATTACKS: ICD-10-CM

## 2023-04-03 DIAGNOSIS — F33.1 MODERATE EPISODE OF RECURRENT MAJOR DEPRESSIVE DISORDER: Primary | ICD-10-CM

## 2023-04-03 DIAGNOSIS — F41.1 GAD (GENERALIZED ANXIETY DISORDER): ICD-10-CM

## 2023-04-03 PROCEDURE — 99214 OFFICE O/P EST MOD 30 MIN: CPT | Mod: S$PBB,,, | Performed by: STUDENT IN AN ORGANIZED HEALTH CARE EDUCATION/TRAINING PROGRAM

## 2023-04-03 PROCEDURE — 99999 PR PBB SHADOW E&M-EST. PATIENT-LVL III: CPT | Mod: PBBFAC,,, | Performed by: STUDENT IN AN ORGANIZED HEALTH CARE EDUCATION/TRAINING PROGRAM

## 2023-04-03 PROCEDURE — 90833 PSYTX W PT W E/M 30 MIN: CPT | Mod: S$PBB,,, | Performed by: STUDENT IN AN ORGANIZED HEALTH CARE EDUCATION/TRAINING PROGRAM

## 2023-04-03 PROCEDURE — 90833 PR PSYCHOTHERAPY W/PATIENT W/E&M, 30 MIN (ADD ON): ICD-10-PCS | Mod: S$PBB,,, | Performed by: STUDENT IN AN ORGANIZED HEALTH CARE EDUCATION/TRAINING PROGRAM

## 2023-04-03 PROCEDURE — 99214 PR OFFICE/OUTPT VISIT, EST, LEVL IV, 30-39 MIN: ICD-10-PCS | Mod: S$PBB,,, | Performed by: STUDENT IN AN ORGANIZED HEALTH CARE EDUCATION/TRAINING PROGRAM

## 2023-04-03 PROCEDURE — 99213 OFFICE O/P EST LOW 20 MIN: CPT | Mod: PBBFAC | Performed by: STUDENT IN AN ORGANIZED HEALTH CARE EDUCATION/TRAINING PROGRAM

## 2023-04-03 PROCEDURE — 99999 PR PBB SHADOW E&M-EST. PATIENT-LVL III: ICD-10-PCS | Mod: PBBFAC,,, | Performed by: STUDENT IN AN ORGANIZED HEALTH CARE EDUCATION/TRAINING PROGRAM

## 2023-04-03 RX ORDER — BUPROPION HYDROCHLORIDE 150 MG/1
TABLET ORAL
Qty: 30 TABLET | Refills: 2 | Status: SHIPPED | OUTPATIENT
Start: 2023-04-03 | End: 2023-05-16 | Stop reason: SDUPTHER

## 2023-04-03 RX ORDER — BUPROPION HYDROCHLORIDE 300 MG/1
300 TABLET ORAL DAILY
Qty: 30 TABLET | Refills: 3 | Status: SHIPPED | OUTPATIENT
Start: 2023-04-03 | End: 2023-05-16 | Stop reason: SDUPTHER

## 2023-04-03 NOTE — PROGRESS NOTES
"                04/03/2023  4:20 PM  Rissa Parker  [id number]    Outpatient Psychiatry Follow-Up Visit (MD/NP)    4/3/2023    Clinical Status of Patient:  Outpatient (Ambulatory)    Chief Complaint:  Rissa Parker is a 27 y.o. female who presents today for follow-up of depression and anxiety.  Met with patient.        Interval History and Content of Current Session:  Interim Events/Subjective Report/Content of Current Session:   MDD, recurrent, severe  JENNIFER with panic attacks  Psychosocial stressors  Obesity  Insomnia      She reports "I'm doing so so, but I've noticed, over the last 2 weeks, it's hard to do things, it's hard to shower, I'll do it but it takes a lot of you have to do this." She states she is working a lot and tries to relax on her days off. She reports her mood is not depressed, however, anhedonia "more often than not," for the last 2 weeks.    JENNIFER symptoms are "not too bad, it's manageable." No recent panic attacks.        Psychiatric Review Of Systems - Is patient experiencing or having changes in:  sleep: variable  appetite: "reduced" on ozempic from Endo  energy/anergy:variable, "some days great, some days not"  interest/pleasure/anhedonia: yes   Anxiety: less   Panic: less  Guilty/hopelessness/worthlessness: guilt, "I had to rearrange a lot of appointments, and I feel really bad about it."   concentration: "sometimes"  S.I.B.s/risky behavior: no  Irritability: yes  Substance abuse: occasional glass of wine  Racing thoughts: no  Impulsive behaviors: no   Paranoia: no  AVH: no          Psychotherapy:  Target symptoms: depression  Why chosen therapy is appropriate versus another modality: relevant to diagnosis  Outcome monitoring methods: self-report  Therapeutic intervention type: supportive psychotherapy  Topics discussed/themes:  educational  The patient's response to the intervention is accepting. The patient's progress toward treatment goals is excellent.   Duration of intervention: " 16 minutes.        Medical Review of Systems   Review of Systems   Constitutional:  Negative for chills and fever.   HENT:  Negative for hearing loss.    Eyes:  Negative for blurred vision and double vision.   Respiratory:  Negative for shortness of breath.    Cardiovascular:  Negative for chest pain.   Gastrointestinal:  Negative for constipation, diarrhea, nausea and vomiting.   Genitourinary:  Negative for dysuria.   Musculoskeletal:  Negative for back pain and neck pain.   Skin:  Negative for rash.   Neurological:  Negative for dizziness and headaches.   Endo/Heme/Allergies:  Negative for environmental allergies.         Past Medical, Family and Social History: The patient's past medical, family and social history have been reviewed and updated as appropriate within the electronic medical record - see encounter notes.        Social History     Socioeconomic History    Marital status:    Tobacco Use    Smoking status: Never    Smokeless tobacco: Never   Substance and Sexual Activity    Alcohol use: Yes     Comment: occasionally         Compliance: yes    Side effects: None    Risk Parameters:  Patient reports no suicidal ideation  Patient reports no homicidal ideation  Patient reports no self-injurious behavior  Patient reports no violent behavior        Exam (detailed: at least 9 elements; comprehensive: all 15 elements)     Vitals:    04/03/23 0933   BP: 104/62   Pulse: 89   Resp: 17       Wt Readings from Last 4 Encounters:   04/03/23 87.9 kg (193 lb 12.6 oz)   02/20/23 92 kg (202 lb 13.2 oz)   02/15/23 90.7 kg (200 lb)   12/12/22 91.3 kg (201 lb 6.2 oz)       Body mass index is 32.25 kg/m².          CONSTITUTIONAL  General Appearance: unremarkable, age appropriate    MUSCULOSKELETAL  Muscle Strength and Tone:no tremor, no tic  Abnormal Involuntary Movements: No  Gait and Station: non-ataxic    PSYCHIATRIC   Level of Consciousness: awake and alert   Orientation: person, place and situation  Grooming:  "Casually dressed and Well groomed  Psychomotor Behavior: normal, cooperative  Speech: normal tone, normal rate, normal pitch, normal volume  Language: grossly intact  Mood: "so so"  Affect: Consistent with mood  Thought Process: linear, logical  Associations: intact   Thought Content: DENIES suicidal ideation and DENIES homicidal ideation  Perceptions: denies hallucinations  Memory: Able to recall past events, Remote intact and Recent intact  Attention: Attends to interview without distraction  Fund of Knowledge: Aware of current events and Vocabulary appropriate   Estimate if Intelligence:  Average based on work/education history, vocabulary and mental status exam  Insight: has awareness of illness  Judgment: behavior is adequate to circumstances          Assessment and Diagnosis   Status/Progress: Based on the examination today, the patient's problem(s) is/are adequately but not ideally controlled.  New problems have not been presented today.   Co-morbidities are complicating management of the primary condition.          General Impression:      MDD, recurrent, severe  JENNIFER with panic attacks  Psychosocial stressors  Obesity     Insomnia     R/o PTSD              Plan:         MDD, recurrent, severe  - increase wellbutrin 300 to 450 mg PO qd  - continue psychotherapy with Patricia Portillo  - pt counseled on diet, exercise, etc.  - taking LMF supplementation  - pt counseled        JENNIFER with panic attacks  - continue xanax 0.25 mg PO qd PRN  - continue psychotherapy with [facility]  - pt counseled on diet, exercise, etc.  - taking LMF supplementation  - pt counseled        Psychosocial stressors  - pt counseled  - continue psychotherapy with [facility]        Obesity  - avoid medications with high potential for serious weight gain (remeron, seroquel, etc.)       Insomnia   - pt counseled  - using CPAP now and sleeping better, feels more rested, no longer taking ambien        Elevated A1c  - referred to endocrine, note " reviewed          - Instructed patient to keep all scheduled appointments, take medications as prescribed and abstain from substance abuse. Instructed to call 911 or present to ER for emergency including SI or HI.    - Discussed diagnosis, risks and benefits of proposed treatment above vs alternative treatments vs no treatment, and potential side effects of these treatments including risks in pregnancy. Discussed the inherent unpredictability of treatment. The patient expresses understanding of the above and displays the capacity to agree with this treatment given said understanding. Patient also agrees that, currently, the benefits outweigh the risks and would like to pursue this treatment at this time.         Reilly Infante III, MD    Return to Clinic: 3 months

## 2023-05-02 ENCOUNTER — PATIENT MESSAGE (OUTPATIENT)
Dept: PSYCHIATRY | Facility: CLINIC | Age: 28
End: 2023-05-02
Payer: OTHER GOVERNMENT

## 2023-05-04 RX ORDER — HYDROXYZINE HYDROCHLORIDE 50 MG/1
TABLET, FILM COATED ORAL
Qty: 60 TABLET | Refills: 2 | Status: SHIPPED | OUTPATIENT
Start: 2023-05-04 | End: 2023-07-19 | Stop reason: SDUPTHER

## 2023-05-16 ENCOUNTER — OFFICE VISIT (OUTPATIENT)
Dept: PSYCHIATRY | Facility: CLINIC | Age: 28
End: 2023-05-16
Payer: OTHER GOVERNMENT

## 2023-05-16 VITALS
HEART RATE: 100 BPM | WEIGHT: 183.88 LBS | SYSTOLIC BLOOD PRESSURE: 115 MMHG | BODY MASS INDEX: 30.64 KG/M2 | RESPIRATION RATE: 17 BRPM | DIASTOLIC BLOOD PRESSURE: 62 MMHG | HEIGHT: 65 IN | OXYGEN SATURATION: 98 %

## 2023-05-16 DIAGNOSIS — F41.0 PANIC ATTACKS: ICD-10-CM

## 2023-05-16 DIAGNOSIS — F41.1 GAD (GENERALIZED ANXIETY DISORDER): ICD-10-CM

## 2023-05-16 DIAGNOSIS — F33.1 MODERATE EPISODE OF RECURRENT MAJOR DEPRESSIVE DISORDER: Primary | ICD-10-CM

## 2023-05-16 DIAGNOSIS — Z65.8 PSYCHOSOCIAL STRESSORS: ICD-10-CM

## 2023-05-16 PROCEDURE — 90833 PR PSYCHOTHERAPY W/PATIENT W/E&M, 30 MIN (ADD ON): ICD-10-PCS | Mod: S$PBB,,, | Performed by: STUDENT IN AN ORGANIZED HEALTH CARE EDUCATION/TRAINING PROGRAM

## 2023-05-16 PROCEDURE — 99214 PR OFFICE/OUTPT VISIT, EST, LEVL IV, 30-39 MIN: ICD-10-PCS | Mod: S$PBB,,, | Performed by: STUDENT IN AN ORGANIZED HEALTH CARE EDUCATION/TRAINING PROGRAM

## 2023-05-16 PROCEDURE — 99999 PR PBB SHADOW E&M-EST. PATIENT-LVL III: CPT | Mod: PBBFAC,,, | Performed by: STUDENT IN AN ORGANIZED HEALTH CARE EDUCATION/TRAINING PROGRAM

## 2023-05-16 PROCEDURE — 90833 PSYTX W PT W E/M 30 MIN: CPT | Mod: S$PBB,,, | Performed by: STUDENT IN AN ORGANIZED HEALTH CARE EDUCATION/TRAINING PROGRAM

## 2023-05-16 PROCEDURE — 99999 PR PBB SHADOW E&M-EST. PATIENT-LVL III: ICD-10-PCS | Mod: PBBFAC,,, | Performed by: STUDENT IN AN ORGANIZED HEALTH CARE EDUCATION/TRAINING PROGRAM

## 2023-05-16 PROCEDURE — 99214 OFFICE O/P EST MOD 30 MIN: CPT | Mod: S$PBB,,, | Performed by: STUDENT IN AN ORGANIZED HEALTH CARE EDUCATION/TRAINING PROGRAM

## 2023-05-16 PROCEDURE — 99213 OFFICE O/P EST LOW 20 MIN: CPT | Mod: PBBFAC | Performed by: STUDENT IN AN ORGANIZED HEALTH CARE EDUCATION/TRAINING PROGRAM

## 2023-05-16 RX ORDER — BUPROPION HYDROCHLORIDE 150 MG/1
TABLET ORAL
Qty: 30 TABLET | Refills: 2 | Status: SHIPPED | OUTPATIENT
Start: 2023-05-16 | End: 2023-07-19 | Stop reason: SDUPTHER

## 2023-05-16 RX ORDER — BUPROPION HYDROCHLORIDE 300 MG/1
300 TABLET ORAL DAILY
Qty: 30 TABLET | Refills: 2 | Status: SHIPPED | OUTPATIENT
Start: 2023-05-16 | End: 2023-07-19 | Stop reason: SDUPTHER

## 2023-05-16 NOTE — PROGRESS NOTES
"                05/16/2023  4:20 PM  Rissa Parker  [id number]    Outpatient Psychiatry Follow-Up Visit (MD/NP)    5/16/2023    Clinical Status of Patient:  Outpatient (Ambulatory)    Chief Complaint:  Rissa Parker is a 27 y.o. female who presents today for follow-up of depression and anxiety.  Met with patient.        Interval History and Content of Current Session:  Interim Events/Subjective Report/Content of Current Session:   MDD, recurrent, severe  JENNIFER with panic attacks  Psychosocial stressors  Obesity  Insomnia      She reports "it's been up and down... I couldn't sleep and it was really effecting me... it was getting really bad, it affected my depression." Reports hydroxyzine that was called in has helped, "it's been a roller coaster." Reports her mood currently is "so so, not super depressed, just somewhere in the middle," continues to struggle motivation, "it's taking more effort, but I can still get out of bed."      JENNIFER symptoms are minimal, "here and there, for the most part it's okay."        Psychiatric Review Of Systems - Is patient experiencing or having changes in:  sleep: variable, taking hydroxyzine  appetite: less, on ozempic from Endo  energy/anergy: variable  interest/pleasure/anhedonia: yes   Anxiety: less   Panic: less  Guilty/hopelessness/worthlessness: less  concentration: "harder lately"  S.I.B.s/risky behavior: no  Irritability: yes  Substance abuse: rarely ETOH, 1 drink (average one drink biweekly)  Racing thoughts: no  Impulsive behaviors: no   Paranoia: no  AVH: no        Psychotherapy:  Target symptoms: depression  Why chosen therapy is appropriate versus another modality: relevant to diagnosis  Outcome monitoring methods: self-report  Therapeutic intervention type: supportive psychotherapy  Topics discussed/themes:  educational, work stress, building skills sets for symptom management, symptom recognition,   The patient's response to the intervention is accepting. The " patient's progress toward treatment goals is excellent.   Duration of intervention: 17 minutes.        Medical Review of Systems   Review of Systems   Constitutional:  Negative for chills and fever.   HENT:  Negative for hearing loss.    Eyes:  Negative for blurred vision and double vision.   Respiratory:  Negative for shortness of breath.    Cardiovascular:  Negative for chest pain.   Gastrointestinal:  Negative for constipation, diarrhea, nausea and vomiting.   Genitourinary:  Negative for dysuria.   Musculoskeletal:  Negative for back pain and neck pain.   Skin:  Negative for rash.   Neurological:  Negative for dizziness and headaches.   Endo/Heme/Allergies:  Negative for environmental allergies.         Past Medical, Family and Social History: The patient's past medical, family and social history have been reviewed and updated as appropriate within the electronic medical record - see encounter notes.        Social History     Socioeconomic History    Marital status:    Tobacco Use    Smoking status: Never    Smokeless tobacco: Never   Substance and Sexual Activity    Alcohol use: Yes     Comment: occasionally         Compliance: yes    Side effects: None    Risk Parameters:  Patient reports no suicidal ideation  Patient reports no homicidal ideation  Patient reports no self-injurious behavior  Patient reports no violent behavior        Exam (detailed: at least 9 elements; comprehensive: all 15 elements)     Vitals:    05/16/23 0835   BP: 115/62   Pulse: 100   Resp: 17       Wt Readings from Last 4 Encounters:   05/16/23 83.4 kg (183 lb 13.8 oz)   04/03/23 87.9 kg (193 lb 12.6 oz)   02/20/23 92 kg (202 lb 13.2 oz)   02/15/23 90.7 kg (200 lb)       Body mass index is 30.6 kg/m².          CONSTITUTIONAL  General Appearance: unremarkable, age appropriate    MUSCULOSKELETAL  Muscle Strength and Tone:no tremor, no tic  Abnormal Involuntary Movements: No  Gait and Station: non-ataxic    PSYCHIATRIC   Level of  "Consciousness: awake and alert   Orientation: person, place and situation  Grooming: Casually dressed and Well groomed  Psychomotor Behavior: normal, cooperative  Speech: normal tone, normal rate, normal pitch, normal volume  Language: grossly intact  Mood: "ok"  Affect: Consistent with mood  Thought Process: linear, logical  Associations: intact   Thought Content: DENIES suicidal ideation and DENIES homicidal ideation  Perceptions: denies hallucinations  Memory: Able to recall past events, Remote intact and Recent intact  Attention: Attends to interview without distraction  Fund of Knowledge: Aware of current events and Vocabulary appropriate   Estimate if Intelligence:  Average based on work/education history, vocabulary and mental status exam  Insight: has awareness of illness  Judgment: behavior is adequate to circumstances          Assessment and Diagnosis   Status/Progress: Based on the examination today, the patient's problem(s) is/are adequately but not ideally controlled.  New problems have not been presented today.   Co-morbidities are complicating management of the primary condition.          General Impression:        MDD, recurrent, severe  JENNIFER with panic attacks  Psychosocial stressors  Obesity     Insomnia     R/o PTSD              Plan:         MDD, recurrent, severe  - continue wellbutrin 450 mg PO qd (plan to dc if pregnant, +IC for birth defects)  - continue psychotherapy with Patricia Portillo  - pt counseled on diet, exercise, etc.  - taking LMF supplementation  - pt counseled        JENNIFER with panic attacks  - continue hydroxyzine  mg PO qhs PRN (plan to dc if pregnant, +IC for birth defects)  - continue xanax 0.25 mg PO qd PRN, takes rarely (plan to dc if pregnant, +IC for birth defects)  - continue psychotherapy with [facility]  - pt counseled on diet, exercise, etc.  - taking LMF supplementation  - pt counseled        Psychosocial stressors  - pt counseled  - continue psychotherapy with [facility]      "   Obesity  - avoid medications with high potential for serious weight gain (remeron, seroquel, etc.)       Insomnia   - pt counseled  - using CPAP now and sleeping better, feels more rested, no longer taking ambien        Elevated A1c  - referred to endocrine, note reviewed          - Instructed patient to keep all scheduled appointments, take medications as prescribed and abstain from substance abuse. Instructed to call 911 or present to ER for emergency including SI or HI.    - Discussed diagnosis, risks and benefits of proposed treatment above vs alternative treatments vs no treatment, and potential side effects of these treatments including risks in pregnancy. Discussed the inherent unpredictability of treatment. The patient expresses understanding of the above and displays the capacity to agree with this treatment given said understanding. Patient also agrees that, currently, the benefits outweigh the risks and would like to pursue this treatment at this time.         Reilly Infante III, MD    Return to Clinic: 3 months

## 2023-06-21 RX ORDER — SEMAGLUTIDE 0.68 MG/ML
0.5 INJECTION, SOLUTION SUBCUTANEOUS
Qty: 3 ML | Refills: 11 | Status: SHIPPED | OUTPATIENT
Start: 2023-06-21 | End: 2024-02-23

## 2023-07-19 ENCOUNTER — OFFICE VISIT (OUTPATIENT)
Dept: PSYCHIATRY | Facility: CLINIC | Age: 28
End: 2023-07-19
Payer: OTHER GOVERNMENT

## 2023-07-19 VITALS
SYSTOLIC BLOOD PRESSURE: 114 MMHG | BODY MASS INDEX: 29.26 KG/M2 | HEIGHT: 65 IN | DIASTOLIC BLOOD PRESSURE: 74 MMHG | RESPIRATION RATE: 17 BRPM | OXYGEN SATURATION: 99 % | HEART RATE: 84 BPM | WEIGHT: 175.63 LBS

## 2023-07-19 DIAGNOSIS — F41.0 PANIC ATTACKS: ICD-10-CM

## 2023-07-19 DIAGNOSIS — Z65.8 PSYCHOSOCIAL STRESSORS: ICD-10-CM

## 2023-07-19 DIAGNOSIS — F33.1 MODERATE EPISODE OF RECURRENT MAJOR DEPRESSIVE DISORDER: Primary | ICD-10-CM

## 2023-07-19 DIAGNOSIS — F41.1 GAD (GENERALIZED ANXIETY DISORDER): ICD-10-CM

## 2023-07-19 PROCEDURE — 99213 OFFICE O/P EST LOW 20 MIN: CPT | Mod: PBBFAC | Performed by: STUDENT IN AN ORGANIZED HEALTH CARE EDUCATION/TRAINING PROGRAM

## 2023-07-19 PROCEDURE — 99214 OFFICE O/P EST MOD 30 MIN: CPT | Mod: S$PBB,,, | Performed by: STUDENT IN AN ORGANIZED HEALTH CARE EDUCATION/TRAINING PROGRAM

## 2023-07-19 PROCEDURE — 99999 PR PBB SHADOW E&M-EST. PATIENT-LVL III: ICD-10-PCS | Mod: PBBFAC,,, | Performed by: STUDENT IN AN ORGANIZED HEALTH CARE EDUCATION/TRAINING PROGRAM

## 2023-07-19 PROCEDURE — 90833 PR PSYCHOTHERAPY W/PATIENT W/E&M, 30 MIN (ADD ON): ICD-10-PCS | Mod: ,,, | Performed by: STUDENT IN AN ORGANIZED HEALTH CARE EDUCATION/TRAINING PROGRAM

## 2023-07-19 PROCEDURE — 90833 PSYTX W PT W E/M 30 MIN: CPT | Mod: ,,, | Performed by: STUDENT IN AN ORGANIZED HEALTH CARE EDUCATION/TRAINING PROGRAM

## 2023-07-19 PROCEDURE — 99214 PR OFFICE/OUTPT VISIT, EST, LEVL IV, 30-39 MIN: ICD-10-PCS | Mod: S$PBB,,, | Performed by: STUDENT IN AN ORGANIZED HEALTH CARE EDUCATION/TRAINING PROGRAM

## 2023-07-19 PROCEDURE — 99999 PR PBB SHADOW E&M-EST. PATIENT-LVL III: CPT | Mod: PBBFAC,,, | Performed by: STUDENT IN AN ORGANIZED HEALTH CARE EDUCATION/TRAINING PROGRAM

## 2023-07-19 RX ORDER — BUPROPION HYDROCHLORIDE 300 MG/1
300 TABLET ORAL DAILY
Qty: 30 TABLET | Refills: 2 | Status: SHIPPED | OUTPATIENT
Start: 2023-07-19 | End: 2023-10-17 | Stop reason: SDUPTHER

## 2023-07-19 RX ORDER — CHORIOGONADOTROPIN ALFA 250 UG/.5ML
1 INJECTION, SOLUTION SUBCUTANEOUS ONCE
COMMUNITY
Start: 2023-07-11 | End: 2024-02-28 | Stop reason: ALTCHOICE

## 2023-07-19 RX ORDER — CLOMIPHENE CITRATE 50 MG/1
TABLET ORAL
COMMUNITY
Start: 2023-07-12 | End: 2024-02-28 | Stop reason: ALTCHOICE

## 2023-07-19 RX ORDER — BUPROPION HYDROCHLORIDE 150 MG/1
TABLET ORAL
Qty: 30 TABLET | Refills: 2 | Status: SHIPPED | OUTPATIENT
Start: 2023-07-19 | End: 2023-10-17

## 2023-07-19 RX ORDER — ESTRADIOL 1 MG/1
1 TABLET ORAL
COMMUNITY
Start: 2023-07-12 | End: 2024-02-28 | Stop reason: ALTCHOICE

## 2023-07-19 RX ORDER — HYDROXYZINE HYDROCHLORIDE 50 MG/1
TABLET, FILM COATED ORAL
Qty: 60 TABLET | Refills: 2 | Status: SHIPPED | OUTPATIENT
Start: 2023-07-19 | End: 2023-11-28 | Stop reason: SDUPTHER

## 2023-07-19 RX ORDER — PROGESTERONE 200 MG/1
CAPSULE ORAL
COMMUNITY
Start: 2023-05-24 | End: 2024-02-28 | Stop reason: ALTCHOICE

## 2023-07-19 RX ORDER — ALPRAZOLAM 0.25 MG/1
0.25 TABLET ORAL DAILY PRN
Qty: 10 TABLET | Refills: 1 | Status: SHIPPED | OUTPATIENT
Start: 2023-07-19 | End: 2024-02-28

## 2023-07-19 NOTE — PROGRESS NOTES
"                  07/19/2023  4:20 PM  Rissa Parker  [id number]    Outpatient Psychiatry Follow-Up Visit (MD/NP)    7/19/2023    Clinical Status of Patient:  Outpatient (Ambulatory)    Chief Complaint:  Rissa Parker is a 27 y.o. female who presents today for follow-up of depression and anxiety.  Met with patient.        Interval History and Content of Current Session:  Interim Events/Subjective Report/Content of Current Session:   MDD, recurrent, severe  JENNIFER with panic attacks  Psychosocial stressors  Obesity  Insomnia        Reports depression has been "manageable, but more days I don't want to get up, but I still did, overall I think okay, I'm not staying in bed not moving, but I can tell it's there." She states JENNIFER symptoms increased one day recently, took xanax which helped. No recent panic attacks.    Her dog passed away which was difficult, "we had to put my dog down, he had cancer, it was time."      Psychiatric Review Of Systems - Is patient experiencing or having changes in:  sleep: variable, taking hydroxyzine, "hit or miss"  appetite: less, on ozempic   energy/anergy: "lower"  interest/pleasure/anhedonia: yes, "I don't want to do that, don't want to go"  Anxiety: less   Panic: less  Guilty/hopelessness/worthlessness: guilt  concentration: denies  S.I.B.s/risky behavior: no  Irritability: "so so"  Substance abuse: rarely ETOH  Racing thoughts: no  Impulsive behaviors: no   Paranoia: no  AVH: no        Psychotherapy:  Target symptoms: depression  Why chosen therapy is appropriate versus another modality: relevant to diagnosis  Outcome monitoring methods: self-report  Therapeutic intervention type: supportive psychotherapy  Topics discussed/themes:  educational, illness/death of a loved one, building skills sets for symptom management, symptom recognition,   The patient's response to the intervention is accepting. The patient's progress toward treatment goals is good.   Duration of intervention: 16 " minutes.        Medical Review of Systems   Review of Systems   Constitutional:  Negative for chills and fever.   HENT:  Negative for hearing loss.    Eyes:  Negative for blurred vision and double vision.   Respiratory:  Negative for shortness of breath.    Cardiovascular:  Negative for chest pain.   Gastrointestinal:  Negative for constipation, diarrhea, nausea and vomiting.   Genitourinary:  Negative for dysuria.   Musculoskeletal:  Negative for back pain and neck pain.   Skin:  Negative for rash.   Neurological:  Negative for dizziness and headaches.   Endo/Heme/Allergies:  Negative for environmental allergies.       Past Medical, Family and Social History: The patient's past medical, family and social history have been reviewed and updated as appropriate within the electronic medical record - see encounter notes.        Social History     Socioeconomic History    Marital status:    Tobacco Use    Smoking status: Never    Smokeless tobacco: Never   Substance and Sexual Activity    Alcohol use: Yes     Comment: occasionally         Compliance: yes    Side effects: None    Risk Parameters:  Patient reports no suicidal ideation  Patient reports no homicidal ideation  Patient reports no self-injurious behavior  Patient reports no violent behavior        Exam (detailed: at least 9 elements; comprehensive: all 15 elements)     Vitals:    07/19/23 1305   BP: 114/74   Pulse: 84   Resp: 17       Wt Readings from Last 4 Encounters:   07/19/23 79.7 kg (175 lb 9.6 oz)   05/16/23 83.4 kg (183 lb 13.8 oz)   04/03/23 87.9 kg (193 lb 12.6 oz)   02/20/23 92 kg (202 lb 13.2 oz)       Body mass index is 29.22 kg/m².          CONSTITUTIONAL  General Appearance: unremarkable, age appropriate    MUSCULOSKELETAL  Muscle Strength and Tone:no tremor, no tic  Abnormal Involuntary Movements: No  Gait and Station: non-ataxic    PSYCHIATRIC   Level of Consciousness: awake and alert   Orientation: person, place and situation  Grooming:  "Casually dressed and Well groomed  Psychomotor Behavior: normal, cooperative  Speech: normal tone, normal rate, normal pitch, normal volume  Language: grossly intact  Mood: "ok"  Affect: Consistent with mood  Thought Process: linear, logical  Associations: intact   Thought Content: DENIES suicidal ideation and DENIES homicidal ideation  Perceptions: denies hallucinations  Memory: Able to recall past events, Remote intact and Recent intact  Attention: Attends to interview without distraction  Fund of Knowledge: Aware of current events and Vocabulary appropriate   Estimate if Intelligence:  Average based on work/education history, vocabulary and mental status exam  Insight: has awareness of illness  Judgment: behavior is adequate to circumstances          Assessment and Diagnosis   Status/Progress: Based on the examination today, the patient's problem(s) is/are adequately but not ideally controlled.  New problems have not been presented today.   Co-morbidities are complicating management of the primary condition.          General Impression:        MDD, recurrent, severe  JENNIFER with panic attacks  Psychosocial stressors  Obesity     Insomnia     R/o PTSD              Plan:         MDD, recurrent, severe  - continue wellbutrin 450 mg PO qd (plan to dc if pregnant, +IC for birth defects)  - continue psychotherapy with Patricia Portillo  - pt counseled on diet, exercise, etc.  - taking LMF supplementation  - pt counseled        JENNIFER with panic attacks  - continue hydroxyzine  mg PO qhs PRN (plan to dc if pregnant, +IC for birth defects)  - continue xanax 0.25 mg PO qd PRN, takes rarely (plan to dc if pregnant, +IC for birth defects)  - continue psychotherapy with [facility]  - pt counseled on diet, exercise, etc.  - taking LMF supplementation  - pt counseled        Psychosocial stressors  - pt counseled  - continue psychotherapy with [facility]        Obesity  - avoid medications with high potential for serious weight gain (remeron, " seroquel, etc.)       Insomnia   - pt counseled  - using CPAP now and sleeping better, feels more rested, no longer taking ambien        Elevated A1c  - referred to endocrine, note reviewed          - Instructed patient to keep all scheduled appointments, take medications as prescribed and abstain from substance abuse. Instructed to call 911 or present to ER for emergency including SI or HI.    - Discussed diagnosis, risks and benefits of proposed treatment above vs alternative treatments vs no treatment, and potential side effects of these treatments including risks in pregnancy. Discussed the inherent unpredictability of treatment. The patient expresses understanding of the above and displays the capacity to agree with this treatment given said understanding. Patient also agrees that, currently, the benefits outweigh the risks and would like to pursue this treatment at this time.         Reilly Infante III, MD    Return to Clinic: 3 months

## 2023-08-21 ENCOUNTER — LAB VISIT (OUTPATIENT)
Dept: LAB | Facility: HOSPITAL | Age: 28
End: 2023-08-21
Attending: STUDENT IN AN ORGANIZED HEALTH CARE EDUCATION/TRAINING PROGRAM
Payer: OTHER GOVERNMENT

## 2023-08-21 ENCOUNTER — OFFICE VISIT (OUTPATIENT)
Dept: ENDOCRINOLOGY | Facility: CLINIC | Age: 28
End: 2023-08-21
Payer: OTHER GOVERNMENT

## 2023-08-21 VITALS
WEIGHT: 175 LBS | HEART RATE: 87 BPM | RESPIRATION RATE: 16 BRPM | SYSTOLIC BLOOD PRESSURE: 119 MMHG | HEIGHT: 65 IN | BODY MASS INDEX: 29.16 KG/M2 | DIASTOLIC BLOOD PRESSURE: 78 MMHG

## 2023-08-21 DIAGNOSIS — N97.0 ANOVULATION: ICD-10-CM

## 2023-08-21 DIAGNOSIS — R73.03 PREDIABETES: ICD-10-CM

## 2023-08-21 LAB
ESTIMATED AVG GLUCOSE: 88 MG/DL (ref 68–131)
HBA1C MFR BLD: 4.7 % (ref 4–5.6)
TSH SERPL DL<=0.005 MIU/L-ACNC: 0.9 UIU/ML (ref 0.4–4)

## 2023-08-21 PROCEDURE — 99999 PR PBB SHADOW E&M-EST. PATIENT-LVL IV: CPT | Mod: PBBFAC,,, | Performed by: STUDENT IN AN ORGANIZED HEALTH CARE EDUCATION/TRAINING PROGRAM

## 2023-08-21 PROCEDURE — 99214 OFFICE O/P EST MOD 30 MIN: CPT | Mod: S$PBB,,, | Performed by: STUDENT IN AN ORGANIZED HEALTH CARE EDUCATION/TRAINING PROGRAM

## 2023-08-21 PROCEDURE — 99999 PR PBB SHADOW E&M-EST. PATIENT-LVL IV: ICD-10-PCS | Mod: PBBFAC,,, | Performed by: STUDENT IN AN ORGANIZED HEALTH CARE EDUCATION/TRAINING PROGRAM

## 2023-08-21 PROCEDURE — 84443 ASSAY THYROID STIM HORMONE: CPT | Performed by: STUDENT IN AN ORGANIZED HEALTH CARE EDUCATION/TRAINING PROGRAM

## 2023-08-21 PROCEDURE — 99214 OFFICE O/P EST MOD 30 MIN: CPT | Mod: PBBFAC | Performed by: STUDENT IN AN ORGANIZED HEALTH CARE EDUCATION/TRAINING PROGRAM

## 2023-08-21 PROCEDURE — 99214 PR OFFICE/OUTPT VISIT, EST, LEVL IV, 30-39 MIN: ICD-10-PCS | Mod: S$PBB,,, | Performed by: STUDENT IN AN ORGANIZED HEALTH CARE EDUCATION/TRAINING PROGRAM

## 2023-08-21 PROCEDURE — 36415 COLL VENOUS BLD VENIPUNCTURE: CPT | Performed by: STUDENT IN AN ORGANIZED HEALTH CARE EDUCATION/TRAINING PROGRAM

## 2023-08-21 PROCEDURE — 83036 HEMOGLOBIN GLYCOSYLATED A1C: CPT | Performed by: STUDENT IN AN ORGANIZED HEALTH CARE EDUCATION/TRAINING PROGRAM

## 2023-08-21 RX ORDER — DOCUSATE SODIUM 100 MG/1
100 CAPSULE, LIQUID FILLED ORAL 2 TIMES DAILY PRN
COMMUNITY

## 2023-08-21 NOTE — PROGRESS NOTES
"Subjective:      Patient ID: Rissa Parker is a 27 y.o. female.    Chief Complaint:  Prediabetes      History of Present Illness  This is a 26 y.o. female. with a past medical history of anxiety, depression, BMI 33 here for follow up of prediabetes.    Prediabetes  Diagnosed in 2021  Reports weight was around 150 lb when she got COVID in late 2020, since then unable to exercise due to lung and heart issues - weight went up to 200s  Started on GLP-1 RA in early 2023    She is on Ozempic 0.5 mg weekly    Lab Results   Component Value Date    HGBA1C 5.1 02/20/2023       Weight: Reports at home 171 lb  Wt Readings from Last 6 Encounters:   08/21/23 79.4 kg (175 lb)   07/19/23 79.7 kg (175 lb 9.6 oz)   05/16/23 83.4 kg (183 lb 13.8 oz)   04/03/23 87.9 kg (193 lb 12.6 oz)   02/20/23 92 kg (202 lb 13.2 oz)   02/15/23 90.7 kg (200 lb)     BMI Readings from Last 1 Encounters:   08/21/23 29.12 kg/m²         1 mg DST - however noted to be on estrogen containing vaginal ring     10/14/2021    Cortisol, 8 AM 4.10 (L)      10/22/2021    Cortisol, 24H Ur 14       Outside labs:  Jan 2021: A1c 5.9%    April 2021  A1c 5.5%  TSH 1.4  Thyroxine 8.1 (N)    Aug 2021  A1c 5.8%  Glucose 86  Cr 0.82  AST/ALT 22/18  LDL 79  Tg 97  HDL 49    June 2022  Glucose 90  TSH 1.7  Lipids ok, LDL 77      Mother - repots low A1c and maternal aunt hypoglycemia  Sister no issues with glucose  Father side of the family: 2 members with Prader-Willi syndrome            Review of Systems  As above    Social and family history reviewed  Current medications and allergies reviewed    Objective:   /78 (BP Location: Left arm, Patient Position: Sitting, BP Method: Medium (Manual))   Pulse 87   Resp 16   Ht 5' 5" (1.651 m)   Wt 79.4 kg (175 lb)   BMI 29.12 kg/m²   Physical Exam   Alert, oriented    BP Readings from Last 1 Encounters:   08/21/23 119/78      Wt Readings from Last 1 Encounters:   08/21/23 1456 79.4 kg (175 lb)     Body mass index is " 29.12 kg/m².    Lab Review:   Lab Results   Component Value Date    HGBA1C 5.1 02/20/2023     Lab Results   Component Value Date    CHOL 162 02/20/2023    HDL 60 02/20/2023    LDLCALC 88.0 02/20/2023    TRIG 70 02/20/2023    CHOLHDL 37.0 02/20/2023     Lab Results   Component Value Date     02/20/2023    K 4.2 02/20/2023     02/20/2023    CO2 26 02/20/2023    GLU 96 02/20/2023    BUN 15 02/20/2023    CREATININE 0.9 02/20/2023    CALCIUM 9.9 02/20/2023    PROT 7.4 10/14/2021    ALBUMIN 3.6 10/14/2021    BILITOT 0.5 10/14/2021    ALKPHOS 77 10/14/2021    AST 20 10/14/2021    ALT 33 10/14/2021    ANIONGAP 10 02/20/2023    ESTGFRAFRICA >60 10/14/2021    EGFRNONAA >60 10/14/2021    TSH 1.131 02/20/2023       All pertinent labs reviewed    Assessment and Plan     Prediabetes  Doing great with Ozempic, will recheck A1c  Suggest keeping same dose as weight still slowly coming down, if she hits a plateau, can increas eodse    Plan  - Continue Ozempic 0.5 mg weekly  - Recheck A1c, TSH  - F/u 6 months - advised to notify me if she gets pregnant    As per Ozempic label, it needs to be stopped 2 months before attempting pregnancy but this is difficult to do on her case as she is struggling with infertility and we don't want to stop med and then not have success with pregnancy. Okay to continue but if gets pregnant needs to stop.    BMI 29.0-29.9,adult  Continue GLP-1 RA given weight loss benefit    Anovulation  She has received treatment with Clomid and Ovidrel without success  Discussed talking to her OBGYN on whether she is a candidate for Femara or not  Continue weight loss       F/u 6 months    Atif Hanna MD  Endocrinology

## 2023-08-21 NOTE — ASSESSMENT & PLAN NOTE
She has received treatment with Clomid and Ovidrel without success  Discussed talking to her OBGYN on whether she is a candidate for Femara or not  Continue weight loss

## 2023-08-21 NOTE — ASSESSMENT & PLAN NOTE
Doing great with Ozempic, will recheck A1c  Suggest keeping same dose as weight still slowly coming down, if she hits a plateau, can increas eodse    Plan  - Continue Ozempic 0.5 mg weekly  - Recheck A1c, TSH  - F/u 6 months - advised to notify me if she gets pregnant    As per Ozempic label, it needs to be stopped 2 months before attempting pregnancy but this is difficult to do on her case as she is struggling with infertility and we don't want to stop med and then not have success with pregnancy. Okay to continue but if gets pregnant needs to stop.

## 2023-08-22 ENCOUNTER — PATIENT MESSAGE (OUTPATIENT)
Dept: ENDOCRINOLOGY | Facility: CLINIC | Age: 28
End: 2023-08-22
Payer: OTHER GOVERNMENT

## 2023-09-19 ENCOUNTER — PATIENT MESSAGE (OUTPATIENT)
Dept: ENDOCRINOLOGY | Facility: CLINIC | Age: 28
End: 2023-09-19
Payer: OTHER GOVERNMENT

## 2023-10-17 ENCOUNTER — OFFICE VISIT (OUTPATIENT)
Dept: PSYCHIATRY | Facility: CLINIC | Age: 28
End: 2023-10-17
Payer: OTHER GOVERNMENT

## 2023-10-17 VITALS
HEIGHT: 65 IN | RESPIRATION RATE: 17 BRPM | DIASTOLIC BLOOD PRESSURE: 72 MMHG | HEART RATE: 89 BPM | OXYGEN SATURATION: 99 % | BODY MASS INDEX: 28.01 KG/M2 | SYSTOLIC BLOOD PRESSURE: 108 MMHG | WEIGHT: 168.13 LBS

## 2023-10-17 DIAGNOSIS — Z65.8 PSYCHOSOCIAL STRESSORS: ICD-10-CM

## 2023-10-17 DIAGNOSIS — F33.1 MODERATE EPISODE OF RECURRENT MAJOR DEPRESSIVE DISORDER: Primary | ICD-10-CM

## 2023-10-17 DIAGNOSIS — F41.1 GAD (GENERALIZED ANXIETY DISORDER): ICD-10-CM

## 2023-10-17 DIAGNOSIS — F41.0 PANIC ATTACKS: ICD-10-CM

## 2023-10-17 PROCEDURE — 90833 PR PSYCHOTHERAPY W/PATIENT W/E&M, 30 MIN (ADD ON): ICD-10-PCS | Mod: S$PBB,,, | Performed by: STUDENT IN AN ORGANIZED HEALTH CARE EDUCATION/TRAINING PROGRAM

## 2023-10-17 PROCEDURE — 99999 PR PBB SHADOW E&M-EST. PATIENT-LVL III: ICD-10-PCS | Mod: PBBFAC,,, | Performed by: STUDENT IN AN ORGANIZED HEALTH CARE EDUCATION/TRAINING PROGRAM

## 2023-10-17 PROCEDURE — 99214 PR OFFICE/OUTPT VISIT, EST, LEVL IV, 30-39 MIN: ICD-10-PCS | Mod: S$PBB,,, | Performed by: STUDENT IN AN ORGANIZED HEALTH CARE EDUCATION/TRAINING PROGRAM

## 2023-10-17 PROCEDURE — 99214 OFFICE O/P EST MOD 30 MIN: CPT | Mod: S$PBB,,, | Performed by: STUDENT IN AN ORGANIZED HEALTH CARE EDUCATION/TRAINING PROGRAM

## 2023-10-17 PROCEDURE — 99213 OFFICE O/P EST LOW 20 MIN: CPT | Mod: PBBFAC | Performed by: STUDENT IN AN ORGANIZED HEALTH CARE EDUCATION/TRAINING PROGRAM

## 2023-10-17 PROCEDURE — 90833 PSYTX W PT W E/M 30 MIN: CPT | Mod: S$PBB,,, | Performed by: STUDENT IN AN ORGANIZED HEALTH CARE EDUCATION/TRAINING PROGRAM

## 2023-10-17 PROCEDURE — 99999 PR PBB SHADOW E&M-EST. PATIENT-LVL III: CPT | Mod: PBBFAC,,, | Performed by: STUDENT IN AN ORGANIZED HEALTH CARE EDUCATION/TRAINING PROGRAM

## 2023-10-17 RX ORDER — SERTRALINE HYDROCHLORIDE 50 MG/1
50 TABLET, FILM COATED ORAL DAILY
Qty: 30 TABLET | Refills: 3 | Status: SHIPPED | OUTPATIENT
Start: 2023-10-17 | End: 2023-11-28 | Stop reason: ALTCHOICE

## 2023-10-17 RX ORDER — BUPROPION HYDROCHLORIDE 300 MG/1
300 TABLET ORAL DAILY
Qty: 30 TABLET | Refills: 3 | Status: SHIPPED | OUTPATIENT
Start: 2023-10-17 | End: 2023-11-28 | Stop reason: SDUPTHER

## 2023-10-17 NOTE — PROGRESS NOTES
"    10/17/2023  4:20 PM  Risas Parker  [id number]    Outpatient Psychiatry Follow-Up Visit (MD/NP)    10/17/2023    Clinical Status of Patient:  Outpatient (Ambulatory)    Chief Complaint:  Rissa Parker is a 28 y.o. female who presents today for follow-up of depression and anxiety.  Met with patient.        Interval History and Content of Current Session:  Interim Events/Subjective Report/Content of Current Session:   MDD, recurrent, severe  JENNIFER with panic attacks  Psychosocial stressors  Obesity  Insomnia        Reports "I feel scattered, it's hard to do things, some days I don't feel like myself, just a bleh feeling, like I want to get up and something, but I don't." Reports low mood about half of the time.    Reports anxiety variable, "I had to take a xanax a while back." Reports anxiety typically "manageable, I really worry about something, but I breath and get whatever it is done." Reports 1 recent panic attack, relieved by xanax.        Psychiatric Review Of Systems - Is patient experiencing or having changes in:  sleep: yes  appetite: less, on ozempic   energy/anergy: yes  interest/pleasure/anhedonia: yes  Anxiety: yes   Panic: yes  Guilty/hopelessness/worthlessness: guilt, "when I don't get up and do something"  concentration: yes  S.I.B.s/risky behavior: no  Irritability: yes  Substance abuse: rarely ETOH, "occasional glass of wine"  Racing thoughts: no  Impulsive behaviors: no   Paranoia: no  AVH: no        Psychotherapy:  Target symptoms: depression  Why chosen therapy is appropriate versus another modality: relevant to diagnosis  Outcome monitoring methods: self-report  Therapeutic intervention type: supportive psychotherapy  Topics discussed/themes:  educational, symptom recognition,   The patient's response to the intervention is accepting. The patient's progress toward treatment goals is good.   Duration of intervention: 17 minutes.        Medical Review of Systems   Review of Systems "   Constitutional:  Negative for chills and fever.   HENT:  Negative for hearing loss.    Eyes:  Negative for blurred vision and double vision.   Respiratory:  Negative for shortness of breath.    Cardiovascular:  Negative for chest pain.   Gastrointestinal:  Negative for constipation, diarrhea, nausea and vomiting.   Genitourinary:  Negative for dysuria.   Musculoskeletal:  Negative for back pain and neck pain.   Skin:  Negative for rash.   Neurological:  Negative for dizziness and headaches.   Endo/Heme/Allergies:  Negative for environmental allergies.       Past Medical, Family and Social History: The patient's past medical, family and social history have been reviewed and updated as appropriate within the electronic medical record - see encounter notes.        Social History     Socioeconomic History    Marital status:    Tobacco Use    Smoking status: Never    Smokeless tobacco: Never   Substance and Sexual Activity    Alcohol use: Yes     Comment: occasionally         Compliance: yes    Side effects: None    Risk Parameters:  Patient reports no suicidal ideation  Patient reports no homicidal ideation  Patient reports no self-injurious behavior  Patient reports no violent behavior        Exam (detailed: at least 9 elements; comprehensive: all 15 elements)     Vitals:    10/17/23 0908   BP: 108/72   Pulse: 89   Resp: 17       Wt Readings from Last 4 Encounters:   10/17/23 76.2 kg (168 lb 1.6 oz)   08/21/23 79.4 kg (175 lb)   07/19/23 79.7 kg (175 lb 9.6 oz)   05/16/23 83.4 kg (183 lb 13.8 oz)       Body mass index is 27.97 kg/m².        CONSTITUTIONAL  General Appearance: unremarkable, age appropriate    MUSCULOSKELETAL  Muscle Strength and Tone:no tremor, no tic  Abnormal Involuntary Movements: No  Gait and Station: non-ataxic    PSYCHIATRIC   Level of Consciousness: awake and alert   Orientation: person, place and situation  Grooming: Casually dressed and Well groomed  Psychomotor Behavior: normal,  "cooperative  Speech: normal tone, normal rate, normal pitch, normal volume  Language: grossly intact  Mood: "bleh"  Affect: Consistent with mood  Thought Process: linear, logical  Associations: intact   Thought Content: DENIES suicidal ideation and DENIES homicidal ideation  Perceptions: denies hallucinations  Memory: Able to recall past events, Remote intact and Recent intact  Attention: Attends to interview without distraction  Fund of Knowledge: Aware of current events and Vocabulary appropriate   Estimate if Intelligence:  Average based on work/education history, vocabulary and mental status exam  Insight: has awareness of illness  Judgment: behavior is adequate to circumstances          Assessment and Diagnosis   Status/Progress: Based on the examination today, the patient's problem(s) is/are inadequately controlled.  New problems have not been presented today.   Co-morbidities are complicating management of the primary condition.        General Impression:        MDD, recurrent, severe  JENNIFER with panic attacks  Psychosocial stressors  Obesity     Insomnia     R/o PTSD            Plan:         MDD, recurrent, severe  - decrease wellbutrin to 300 mg PO qd  - start zoloft 50 mg PO qd  - continue psychotherapy with Patricia Portillo  - pt counseled on diet, exercise, etc.  - taking LMF supplementation  - pt counseled        JENNIFER with panic attacks  - continue hydroxyzine  mg PO qhs PRN (plan to dc if pregnant, +IC for birth defects)  - continue xanax 0.25 mg PO qd PRN, takes rarely (plan to dc if pregnant, +IC for birth defects)  - continue psychotherapy with [facility]  - pt counseled on diet, exercise, etc.  - taking LMF supplementation  - pt counseled        Psychosocial stressors  - pt counseled  - continue psychotherapy with [facility]        Obesity  - avoid medications with high potential for serious weight gain (remeron, seroquel, etc.)       Insomnia   - pt counseled  - using CPAP now and sleeping better, feels more " rested, no longer taking ambien        Elevated A1c  - referred to endocrine, note reviewed          - Instructed patient to keep all scheduled appointments, take medications as prescribed and abstain from substance abuse. Instructed to call 911 or present to ER for emergency including SI or HI.    - Discussed diagnosis, risks and benefits of proposed treatment above vs alternative treatments vs no treatment, and potential side effects of these treatments including risks in pregnancy. Discussed the inherent unpredictability of treatment. The patient expresses understanding of the above and displays the capacity to agree with this treatment given said understanding. Patient also agrees that, currently, the benefits outweigh the risks and would like to pursue this treatment at this time.         Reilly Infante III, MD    Return to Clinic: 3 months

## 2023-11-09 ENCOUNTER — PATIENT MESSAGE (OUTPATIENT)
Dept: PSYCHIATRY | Facility: CLINIC | Age: 28
End: 2023-11-09
Payer: OTHER GOVERNMENT

## 2023-11-14 ENCOUNTER — PATIENT MESSAGE (OUTPATIENT)
Dept: ENDOCRINOLOGY | Facility: CLINIC | Age: 28
End: 2023-11-14
Payer: OTHER GOVERNMENT

## 2023-11-28 ENCOUNTER — OFFICE VISIT (OUTPATIENT)
Dept: PSYCHIATRY | Facility: CLINIC | Age: 28
End: 2023-11-28
Payer: OTHER GOVERNMENT

## 2023-11-28 VITALS
HEART RATE: 114 BPM | DIASTOLIC BLOOD PRESSURE: 70 MMHG | SYSTOLIC BLOOD PRESSURE: 110 MMHG | BODY MASS INDEX: 27.16 KG/M2 | HEIGHT: 65 IN | OXYGEN SATURATION: 99 % | WEIGHT: 163 LBS

## 2023-11-28 DIAGNOSIS — F41.1 GAD (GENERALIZED ANXIETY DISORDER): ICD-10-CM

## 2023-11-28 DIAGNOSIS — F33.1 MODERATE EPISODE OF RECURRENT MAJOR DEPRESSIVE DISORDER: ICD-10-CM

## 2023-11-28 DIAGNOSIS — F41.0 PANIC ATTACKS: Primary | ICD-10-CM

## 2023-11-28 DIAGNOSIS — Z65.8 PSYCHOSOCIAL STRESSORS: ICD-10-CM

## 2023-11-28 DIAGNOSIS — G47.00 INSOMNIA, UNSPECIFIED TYPE: ICD-10-CM

## 2023-11-28 PROCEDURE — 99213 OFFICE O/P EST LOW 20 MIN: CPT | Mod: PBBFAC | Performed by: STUDENT IN AN ORGANIZED HEALTH CARE EDUCATION/TRAINING PROGRAM

## 2023-11-28 PROCEDURE — 99999 PR PBB SHADOW E&M-EST. PATIENT-LVL III: CPT | Mod: PBBFAC,,, | Performed by: STUDENT IN AN ORGANIZED HEALTH CARE EDUCATION/TRAINING PROGRAM

## 2023-11-28 PROCEDURE — 99999 PR PBB SHADOW E&M-EST. PATIENT-LVL III: ICD-10-PCS | Mod: PBBFAC,,, | Performed by: STUDENT IN AN ORGANIZED HEALTH CARE EDUCATION/TRAINING PROGRAM

## 2023-11-28 PROCEDURE — 99214 OFFICE O/P EST MOD 30 MIN: CPT | Mod: S$PBB,,, | Performed by: STUDENT IN AN ORGANIZED HEALTH CARE EDUCATION/TRAINING PROGRAM

## 2023-11-28 PROCEDURE — 99214 PR OFFICE/OUTPT VISIT, EST, LEVL IV, 30-39 MIN: ICD-10-PCS | Mod: S$PBB,,, | Performed by: STUDENT IN AN ORGANIZED HEALTH CARE EDUCATION/TRAINING PROGRAM

## 2023-11-28 RX ORDER — HYDROXYZINE HYDROCHLORIDE 50 MG/1
TABLET, FILM COATED ORAL
Qty: 60 TABLET | Refills: 2 | Status: SHIPPED | OUTPATIENT
Start: 2023-11-28 | End: 2024-02-28

## 2023-11-28 RX ORDER — BUPROPION HYDROCHLORIDE 300 MG/1
300 TABLET ORAL DAILY
Qty: 30 TABLET | Refills: 3 | Status: SHIPPED | OUTPATIENT
Start: 2023-11-28 | End: 2024-02-28 | Stop reason: SDUPTHER

## 2023-11-28 NOTE — PROGRESS NOTES
"      11/28/2023  4:20 PM  Rissa Parker  [id number]    Outpatient Psychiatry Follow-Up Visit (MD/NP)    11/28/2023    Clinical Status of Patient:  Outpatient (Ambulatory)    Chief Complaint:  Rissa Parker is a 28 y.o. female who presents today for follow-up of depression and anxiety.  Met with patient.        Interval History and Content of Current Session:  Interim Events/Subjective Report/Content of Current Session:   MDD, recurrent, severe  JENNIFER with panic attacks  Psychosocial stressors  Obesity  Insomnia        Reports mood has been "okay, I'm trying to do more for me, I am using the light and taking more time for me, to do my make up." She states her mood is depressed about half of the time. She did not tolerate zoloft.    JENNIFER symptoms are continuing, "I can usually tell myself it's okay, it's less overwhelming." No recent panic attacks.      Psychiatric Review Of Systems - Is patient experiencing or having changes in:  sleep: yes  appetite: less, on ozempic   energy/anergy: "okay, a little tired"  interest/pleasure/anhedonia: less  Anxiety: yes   Panic: no  Guilty/hopelessness/worthlessness: less  concentration: yes  S.I.B.s/risky behavior: no  Irritability: yes  Substance abuse: no  Racing thoughts: no  Impulsive behaviors: no   Paranoia: no  AVH: no          Medical Review of Systems   Review of Systems   Constitutional:  Negative for chills and fever.   HENT:  Negative for hearing loss.    Eyes:  Negative for blurred vision and double vision.   Respiratory:  Negative for shortness of breath.    Cardiovascular:  Negative for chest pain.   Gastrointestinal:  Negative for constipation, diarrhea, nausea and vomiting.   Genitourinary:  Negative for dysuria.   Musculoskeletal:  Negative for back pain and neck pain.   Skin:  Negative for rash.   Neurological:  Negative for dizziness and headaches.   Endo/Heme/Allergies:  Negative for environmental allergies.       Past Medical, Family and Social History: " "The patient's past medical, family and social history have been reviewed and updated as appropriate within the electronic medical record - see encounter notes.        Social History     Socioeconomic History    Marital status:    Tobacco Use    Smoking status: Never    Smokeless tobacco: Never   Substance and Sexual Activity    Alcohol use: Yes     Comment: occasionally         Compliance: yes    Side effects: None    Risk Parameters:  Patient reports no suicidal ideation  Patient reports no homicidal ideation  Patient reports no self-injurious behavior  Patient reports no violent behavior        Exam (detailed: at least 9 elements; comprehensive: all 15 elements)     Vitals:    11/28/23 1336   BP: 110/70   Pulse: (!) 114       Wt Readings from Last 4 Encounters:   11/28/23 73.9 kg (163 lb)   10/17/23 76.2 kg (168 lb 1.6 oz)   08/21/23 79.4 kg (175 lb)   07/19/23 79.7 kg (175 lb 9.6 oz)       Body mass index is 27.12 kg/m².        CONSTITUTIONAL  General Appearance: unremarkable, age appropriate    MUSCULOSKELETAL  Muscle Strength and Tone:no tremor, no tic  Abnormal Involuntary Movements: No  Gait and Station: non-ataxic    PSYCHIATRIC   Level of Consciousness: awake and alert   Orientation: person, place and situation  Grooming: Casually dressed and Well groomed  Psychomotor Behavior: normal, cooperative  Speech: normal tone, normal rate, normal pitch, normal volume  Language: grossly intact  Mood: "ok"  Affect: Consistent with mood  Thought Process: linear, logical  Associations: intact   Thought Content: DENIES suicidal ideation and DENIES homicidal ideation  Perceptions: denies hallucinations  Memory: Able to recall past events, Remote intact and Recent intact  Attention: Attends to interview without distraction  Fund of Knowledge: Aware of current events and Vocabulary appropriate   Estimate if Intelligence:  Average based on work/education history, vocabulary and mental status exam  Insight: has " awareness of illness  Judgment: behavior is adequate to circumstances          Assessment and Diagnosis   Status/Progress: Based on the examination today, the patient's problem(s) is/are adequately but not ideally controlled.  New problems have not been presented today.   Co-morbidities are complicating management of the primary condition.        General Impression:        MDD, recurrent, severe  JENNIFER with panic attacks  Psychosocial stressors  Obesity     Insomnia     R/o PTSD            Plan:     Pt expresses decision to continue current medication regime without changes, tolerating well, symptoms are adequately controlled.      MDD, recurrent, severe  - continue wellbutrin 300 mg PO qd  - continue psychotherapy with Patricia Portillo  - pt counseled on diet, exercise, etc.  - taking LMF supplementation  - pt counseled        JENNIFER with panic attacks  - continue hydroxyzine  mg PO qhs PRN (plan to dc if pregnant, +IC for birth defects)  - continue xanax 0.25 mg PO qd PRN, takes rarely (plan to dc if pregnant, +IC for birth defects)  - continue psychotherapy with [facility]  - pt counseled on diet, exercise, etc.  - taking LMF supplementation  - pt counseled        Psychosocial stressors  - pt counseled  - continue psychotherapy with [facility]        Obesity  - avoid medications with high potential for serious weight gain (remeron, seroquel, etc.)       Insomnia   - pt counseled  - using CPAP now and sleeping better, feels more rested, no longer taking ambien        Elevated A1c  - referred to endocrine, note reviewed          - Instructed patient to keep all scheduled appointments, take medications as prescribed and abstain from substance abuse. Instructed to call 911 or present to ER for emergency including SI or HI.    - Discussed diagnosis, risks and benefits of proposed treatment above vs alternative treatments vs no treatment, and potential side effects of these treatments including risks in pregnancy. Discussed the  inherent unpredictability of treatment. The patient expresses understanding of the above and displays the capacity to agree with this treatment given said understanding. Patient also agrees that, currently, the benefits outweigh the risks and would like to pursue this treatment at this time.         Reilly Infante III, MD    Return to Clinic: 3 months

## 2024-02-23 ENCOUNTER — OFFICE VISIT (OUTPATIENT)
Dept: ENDOCRINOLOGY | Facility: CLINIC | Age: 29
End: 2024-02-23
Payer: OTHER GOVERNMENT

## 2024-02-23 ENCOUNTER — PATIENT MESSAGE (OUTPATIENT)
Dept: ENDOCRINOLOGY | Facility: CLINIC | Age: 29
End: 2024-02-23

## 2024-02-23 ENCOUNTER — LAB VISIT (OUTPATIENT)
Dept: LAB | Facility: HOSPITAL | Age: 29
End: 2024-02-23
Attending: STUDENT IN AN ORGANIZED HEALTH CARE EDUCATION/TRAINING PROGRAM
Payer: OTHER GOVERNMENT

## 2024-02-23 VITALS — WEIGHT: 158 LBS | BODY MASS INDEX: 26.29 KG/M2

## 2024-02-23 DIAGNOSIS — N97.0 ANOVULATION: ICD-10-CM

## 2024-02-23 DIAGNOSIS — R73.03 PREDIABETES: ICD-10-CM

## 2024-02-23 DIAGNOSIS — L65.9 HAIR LOSS: ICD-10-CM

## 2024-02-23 DIAGNOSIS — R73.03 PREDIABETES: Primary | ICD-10-CM

## 2024-02-23 DIAGNOSIS — E11.65 TYPE 2 DIABETES MELLITUS WITH HYPERGLYCEMIA, WITHOUT LONG-TERM CURRENT USE OF INSULIN: Primary | ICD-10-CM

## 2024-02-23 LAB
ESTIMATED AVG GLUCOSE: 91 MG/DL (ref 68–131)
HBA1C MFR BLD: 4.8 % (ref 4–5.6)

## 2024-02-23 PROCEDURE — 99999 PR PBB SHADOW E&M-EST. PATIENT-LVL III: CPT | Mod: PBBFAC,,, | Performed by: STUDENT IN AN ORGANIZED HEALTH CARE EDUCATION/TRAINING PROGRAM

## 2024-02-23 PROCEDURE — G2211 COMPLEX E/M VISIT ADD ON: HCPCS | Mod: S$PBB,,, | Performed by: STUDENT IN AN ORGANIZED HEALTH CARE EDUCATION/TRAINING PROGRAM

## 2024-02-23 PROCEDURE — 83036 HEMOGLOBIN GLYCOSYLATED A1C: CPT | Performed by: STUDENT IN AN ORGANIZED HEALTH CARE EDUCATION/TRAINING PROGRAM

## 2024-02-23 PROCEDURE — 36415 COLL VENOUS BLD VENIPUNCTURE: CPT | Performed by: STUDENT IN AN ORGANIZED HEALTH CARE EDUCATION/TRAINING PROGRAM

## 2024-02-23 PROCEDURE — 99214 OFFICE O/P EST MOD 30 MIN: CPT | Mod: S$PBB,,, | Performed by: STUDENT IN AN ORGANIZED HEALTH CARE EDUCATION/TRAINING PROGRAM

## 2024-02-23 PROCEDURE — 99213 OFFICE O/P EST LOW 20 MIN: CPT | Mod: PBBFAC | Performed by: STUDENT IN AN ORGANIZED HEALTH CARE EDUCATION/TRAINING PROGRAM

## 2024-02-23 RX ORDER — LETROZOLE 2.5 MG/1
TABLET, FILM COATED ORAL
COMMUNITY
Start: 2024-02-14

## 2024-02-23 NOTE — ASSESSMENT & PLAN NOTE
Doing great with Ozempic, last A1c 4.7%, will recheck A1c  Suggest keeping same dose as weight still slowly coming down, if she hits a plateau, can increase odse    Plan  - Continue Ozempic 0.5 mg weekly  - Recheck A1c  - F/u 4 months - advised to notify me if she gets pregnant    As per Ozempic label, it needs to be stopped 2 months before attempting pregnancy but this is difficult to do on her case as she is struggling with infertility and we don't want to stop med and then not have success with pregnancy. Okay to continue but if gets pregnant needs to stop.

## 2024-02-23 NOTE — ASSESSMENT & PLAN NOTE
She has received treatment with Femara, Clomid and Ovidrel without success  She is seeing [facility] in [place] - Dr. Lafleur  From my perspective she is optimized with a normal A1c and TSH in range for pre-conception

## 2024-02-23 NOTE — ASSESSMENT & PLAN NOTE
Explained commonly seen with weight loss from GLP-1 RA  Ensure good protein intake on diet  Suggested collagen supplement

## 2024-02-23 NOTE — PROGRESS NOTES
Subjective:      Patient ID: Rissa Parker is a 28 y.o. female.    Chief Complaint:  Prediabetes      History of Present Illness  This is a 26 y.o. female. with a past medical history of anxiety, depression, BMI 26, infertility here for follow up of prediabetes.      Infertility  She is seeing the [facility] in [place] - Dr. Lafleur  She has done cycles of Femara and Clomid without success  Recent LH is the highest it's been on day 11    Prediabetes  Diagnosed in 2021  Reports weight was around 150 lb when she got COVID in late 2020, since then unable to exercise due to lung and heart issues - weight went up to 200s  Started on GLP-1 RA in early 2023 with success, weight back to 158 lb    She is on Ozempic 0.5 mg weekly    Lab Results   Component Value Date    HGBA1C 4.7 08/21/2023       Weight: Reports at home 171 lb  Wt Readings from Last 6 Encounters:   02/23/24 71.7 kg (158 lb)   08/21/23 79.4 kg (175 lb)   02/20/23 92 kg (202 lb 13.2 oz)   02/15/23 90.7 kg (200 lb)   08/19/22 92 kg (202 lb 13.2 oz)   03/10/22 93 kg (205 lb)     BMI Readings from Last 1 Encounters:   02/23/24 26.29 kg/m²         1 mg DST - however noted to be on estrogen containing vaginal ring     10/14/2021    Cortisol, 8 AM 4.10 (L)      10/22/2021    Cortisol, 24H Ur 14       Outside labs:  Jan 2021:   A1c 5.9%    April 2021  A1c 5.5%  TSH 1.4  Thyroxine 8.1 (N)    Aug 2021  A1c 5.8%  Glucose 86  Cr 0.82  AST/ALT 22/18  LDL 79  Tg 97  HDL 49    June 2022  Glucose 90  TSH 1.7  Lipids ok, LDL 77    Nov 28, 2023  17OHP: 39 (normal)  Total T 20  PRL 13.7  DHEA-s 80.8  TSH 1.7  LDL 88, Hdl 53, Tg 110        Mother - repots low A1c and maternal aunt hypoglycemia  Sister no issues with glucose  Father side of the family: 2 members with Prader-Willi syndrome            Review of Systems  As above    Social and family history reviewed  Current medications and allergies reviewed    Objective:   Wt 71.7 kg (158 lb)   BMI 26.29 kg/m²    Physical Exam   Alert, oriented    BP Readings from Last 1 Encounters:   08/21/23 119/78      Wt Readings from Last 1 Encounters:   02/23/24 1031 71.7 kg (158 lb)     Body mass index is 26.29 kg/m².    Lab Review:   Lab Results   Component Value Date    HGBA1C 4.7 08/21/2023     Lab Results   Component Value Date    CHOL 162 02/20/2023    HDL 60 02/20/2023    LDLCALC 88.0 02/20/2023    TRIG 70 02/20/2023    CHOLHDL 37.0 02/20/2023     Lab Results   Component Value Date     02/20/2023    K 4.2 02/20/2023     02/20/2023    CO2 26 02/20/2023    GLU 96 02/20/2023    BUN 15 02/20/2023    CREATININE 0.9 02/20/2023    CALCIUM 9.9 02/20/2023    PROT 7.4 10/14/2021    ALBUMIN 3.6 10/14/2021    BILITOT 0.5 10/14/2021    ALKPHOS 77 10/14/2021    AST 20 10/14/2021    ALT 33 10/14/2021    ANIONGAP 10 02/20/2023    ESTGFRAFRICA >60 10/14/2021    EGFRNONAA >60 10/14/2021    TSH 0.898 08/21/2023       All pertinent labs reviewed    Assessment and Plan     Prediabetes  Doing great with Ozempic, last A1c 4.7%, will recheck A1c  Suggest keeping same dose as weight still slowly coming down, if she hits a plateau, can increase odse    Plan  - Continue Ozempic 0.5 mg weekly  - Recheck A1c  - F/u 4 months - advised to notify me if she gets pregnant    As per Ozempic label, it needs to be stopped 2 months before attempting pregnancy but this is difficult to do on her case as she is struggling with infertility and we don't want to stop med and then not have success with pregnancy. Okay to continue but if gets pregnant needs to stop.    BMI 26.0-26.9,adult  Continue GLP-1 RA given weight loss benefit    Anovulation  She has received treatment with Femara, Clomid and Ovidrel without success  She is seeing [facility] in [place] - Dr. Lafleur  From my perspective she is optimized with a normal A1c and TSH in range for pre-conception      Hair loss  Explained commonly seen with weight loss from GLP-1 RA  Ensure good  protein intake on diet  Suggested collagen supplement      Atif Hanna MD  Endocrinology

## 2024-02-25 ENCOUNTER — PATIENT MESSAGE (OUTPATIENT)
Dept: ENDOCRINOLOGY | Facility: CLINIC | Age: 29
End: 2024-02-25
Payer: OTHER GOVERNMENT

## 2024-02-28 ENCOUNTER — OFFICE VISIT (OUTPATIENT)
Dept: PSYCHIATRY | Facility: CLINIC | Age: 29
End: 2024-02-28
Payer: OTHER GOVERNMENT

## 2024-02-28 VITALS
BODY MASS INDEX: 26.76 KG/M2 | DIASTOLIC BLOOD PRESSURE: 72 MMHG | HEIGHT: 65 IN | SYSTOLIC BLOOD PRESSURE: 112 MMHG | WEIGHT: 160.63 LBS | OXYGEN SATURATION: 98 % | RESPIRATION RATE: 17 BRPM | HEART RATE: 90 BPM

## 2024-02-28 DIAGNOSIS — F41.1 GAD (GENERALIZED ANXIETY DISORDER): ICD-10-CM

## 2024-02-28 DIAGNOSIS — F41.0 PANIC ATTACKS: Primary | ICD-10-CM

## 2024-02-28 DIAGNOSIS — Z65.8 PSYCHOSOCIAL STRESSORS: ICD-10-CM

## 2024-02-28 DIAGNOSIS — F33.1 MODERATE EPISODE OF RECURRENT MAJOR DEPRESSIVE DISORDER: ICD-10-CM

## 2024-02-28 PROCEDURE — 99214 OFFICE O/P EST MOD 30 MIN: CPT | Mod: S$PBB,,, | Performed by: STUDENT IN AN ORGANIZED HEALTH CARE EDUCATION/TRAINING PROGRAM

## 2024-02-28 PROCEDURE — 99213 OFFICE O/P EST LOW 20 MIN: CPT | Mod: PBBFAC | Performed by: STUDENT IN AN ORGANIZED HEALTH CARE EDUCATION/TRAINING PROGRAM

## 2024-02-28 PROCEDURE — 99999 PR PBB SHADOW E&M-EST. PATIENT-LVL III: CPT | Mod: PBBFAC,,, | Performed by: STUDENT IN AN ORGANIZED HEALTH CARE EDUCATION/TRAINING PROGRAM

## 2024-02-28 RX ORDER — BUPROPION HYDROCHLORIDE 300 MG/1
300 TABLET ORAL DAILY
Qty: 30 TABLET | Refills: 3 | Status: SHIPPED | OUTPATIENT
Start: 2024-02-28 | End: 2024-04-09

## 2024-02-28 NOTE — PROGRESS NOTES
"        02/28/2024  4:20 PM  Rissa Parker  [id number]    Outpatient Psychiatry Follow-Up Visit (MD/NP)    2/28/2024    Clinical Status of Patient:  Outpatient (Ambulatory)    Chief Complaint:  Rissa Parkre is a 28 y.o. female who presents today for follow-up of depression and anxiety.  Met with patient.        Interval History and Content of Current Session:  Interim Events/Subjective Report/Content of Current Session:   MDD, recurrent, severe  JENNIFER with panic attacks  Psychosocial stressors  Obesity  Insomnia        Reports mood has been "overall alright," occasionally feels depressed, "not terribly often, maybe 2 days a week," which she finds somewhat distressing but manageable, "it's not too bad."    JENNIFER symptoms are variable, "medium amount" reports had a few panic attacks, anxiety is not daily, "it's more when things get too overwhelming or too loud."    Reports PRN xanax effective for panic attacks.        Psychiatric Review Of Systems - Is patient experiencing or having changes in:  sleep: variable, takes melatonin, "it's so so, some nights is much harder to fall asleep.. about par for me"  appetite: "okay", on ozempic   energy/anergy: low, "pretty drained"  interest/pleasure/anhedonia: variable, "some days, just tired, just want to sit here"  Anxiety: variable   Panic: no  Guilty/hopelessness/worthlessness: less  concentration: "Fairly normal"  S.I.B.s/risky behavior: no  Irritability: "if I'm tired, then yeah, very irritable"  Substance abuse: no  Racing thoughts: no  Impulsive behaviors: no   Paranoia: no  AVH: no          Medical Review of Systems   Review of Systems   Constitutional:  Negative for chills and fever.   HENT:  Negative for hearing loss.    Eyes:  Negative for blurred vision and double vision.   Respiratory:  Negative for shortness of breath.    Cardiovascular:  Negative for chest pain.   Gastrointestinal:  Negative for constipation, diarrhea, nausea and vomiting.   Genitourinary:  " "Negative for dysuria.   Musculoskeletal:  Negative for back pain and neck pain.   Skin:  Negative for rash.   Neurological:  Negative for dizziness and headaches.   Endo/Heme/Allergies:  Negative for environmental allergies.       Past Medical, Family and Social History: The patient's past medical, family and social history have been reviewed and updated as appropriate within the electronic medical record - see encounter notes.        Social History     Socioeconomic History    Marital status:    Tobacco Use    Smoking status: Never    Smokeless tobacco: Never   Substance and Sexual Activity    Alcohol use: Yes     Comment: occasionally         Compliance: yes    Side effects: None    Risk Parameters:  Patient reports no suicidal ideation  Patient reports no homicidal ideation  Patient reports no self-injurious behavior  Patient reports no violent behavior        Exam (detailed: at least 9 elements; comprehensive: all 15 elements)     Vitals:    02/28/24 0932   BP: 112/72   Pulse: 90   Resp: 17       Wt Readings from Last 4 Encounters:   02/28/24 72.8 kg (160 lb 9.6 oz)   02/23/24 71.7 kg (158 lb)   11/28/23 73.9 kg (163 lb)   10/17/23 76.2 kg (168 lb 1.6 oz)       Body mass index is 26.73 kg/m².        CONSTITUTIONAL  General Appearance: unremarkable, age appropriate    MUSCULOSKELETAL  Muscle Strength and Tone:no tremor, no tic  Abnormal Involuntary Movements: No  Gait and Station: non-ataxic    PSYCHIATRIC   Level of Consciousness: awake and alert   Orientation: person, place and situation  Grooming: Casually dressed and Well groomed  Psychomotor Behavior: normal, cooperative  Speech: normal tone, normal rate, normal pitch, normal volume  Language: grossly intact  Mood: "ok"  Affect: Consistent with mood  Thought Process: linear, logical  Associations: intact   Thought Content: DENIES suicidal ideation and DENIES homicidal ideation  Perceptions: denies hallucinations  Memory: Able to recall past events, " Remote intact and Recent intact  Attention: Attends to interview without distraction  Fund of Knowledge: Aware of current events and Vocabulary appropriate   Estimate if Intelligence:  Average based on work/education history, vocabulary and mental status exam  Insight: has awareness of illness  Judgment: behavior is adequate to circumstances          Assessment and Diagnosis   Status/Progress: Based on the examination today, the patient's problem(s) is/are adequately but not ideally controlled.  New problems have not been presented today.   Co-morbidities are complicating management of the primary condition.        General Impression:        MDD, recurrent, severe  JENNIFER with panic attacks  Psychosocial stressors  Obesity     Insomnia       Plan:       Pt expresses decision to continue current medication regime without changes, tolerating well, symptoms are adequately controlled.      MDD, recurrent, severe  - continue wellbutrin 300 mg PO qd  - continue psychotherapy with Patricia Portillo  - pt counseled on diet, exercise, etc.  - taking LMF supplementation  - pt counseled        JENNIFER with panic attacks  - continue xanax 0.25 mg PO qd PRN, takes rarely (plan to dc if pregnant, +IC for birth defects)  - continue psychotherapy with [facility]  - pt counseled on diet, exercise, etc.  - taking LMF supplementation  - pt counseled        Psychosocial stressors  - pt counseled  - continue psychotherapy with [facility]        Obesity  - avoid medications with high potential for serious weight gain (remeron, seroquel, etc.)       Insomnia   - pt counseled  - using CPAP now and sleeping better, feels more rested, no longer taking ambien        Elevated A1c  - referred to endocrine, note reviewed          - Instructed patient to keep all scheduled appointments, take medications as prescribed and abstain from substance abuse. Instructed to call 911 or present to ER for emergency including SI or HI.    - Discussed diagnosis, risks and benefits of  proposed treatment above vs alternative treatments vs no treatment, and potential side effects of these treatments including risks in pregnancy. Discussed the inherent unpredictability of treatment. The patient expresses understanding of the above and displays the capacity to agree with this treatment given said understanding. Patient also agrees that, currently, the benefits outweigh the risks and would like to pursue this treatment at this time.         Reilly Infante III, MD    Return to Clinic: 3 months

## 2024-03-11 ENCOUNTER — LAB VISIT (OUTPATIENT)
Dept: LAB | Facility: HOSPITAL | Age: 29
End: 2024-03-11
Attending: OBSTETRICS & GYNECOLOGY
Payer: OTHER GOVERNMENT

## 2024-03-11 DIAGNOSIS — O09.91 SUPERVISION OF HIGH RISK PREGNANCY IN FIRST TRIMESTER: Primary | ICD-10-CM

## 2024-03-11 LAB
ESTRADIOL SERPL-MCNC: 189 PG/ML
HCG INTACT+B SERPL-ACNC: 631 MIU/ML
PROGEST SERPL-MCNC: 25.9 NG/ML

## 2024-03-11 PROCEDURE — 82670 ASSAY OF TOTAL ESTRADIOL: CPT | Performed by: OBSTETRICS & GYNECOLOGY

## 2024-03-11 PROCEDURE — 84702 CHORIONIC GONADOTROPIN TEST: CPT | Performed by: OBSTETRICS & GYNECOLOGY

## 2024-03-11 PROCEDURE — 84144 ASSAY OF PROGESTERONE: CPT | Performed by: OBSTETRICS & GYNECOLOGY

## 2024-03-11 PROCEDURE — 36415 COLL VENOUS BLD VENIPUNCTURE: CPT | Performed by: OBSTETRICS & GYNECOLOGY

## 2024-03-13 ENCOUNTER — PATIENT MESSAGE (OUTPATIENT)
Dept: ENDOCRINOLOGY | Facility: CLINIC | Age: 29
End: 2024-03-13
Payer: OTHER GOVERNMENT

## 2024-03-13 ENCOUNTER — LAB VISIT (OUTPATIENT)
Dept: LAB | Facility: HOSPITAL | Age: 29
End: 2024-03-13
Attending: OBSTETRICS & GYNECOLOGY
Payer: OTHER GOVERNMENT

## 2024-03-13 ENCOUNTER — PATIENT MESSAGE (OUTPATIENT)
Dept: PSYCHIATRY | Facility: CLINIC | Age: 29
End: 2024-03-13
Payer: OTHER GOVERNMENT

## 2024-03-13 DIAGNOSIS — O09.91 SUPERVISION OF HIGH RISK PREGNANCY IN FIRST TRIMESTER: ICD-10-CM

## 2024-03-13 LAB
ESTRADIOL SERPL-MCNC: 166 PG/ML
HCG INTACT+B SERPL-ACNC: 1100 MIU/ML
PROGEST SERPL-MCNC: 19.6 NG/ML

## 2024-03-13 PROCEDURE — 82670 ASSAY OF TOTAL ESTRADIOL: CPT | Performed by: OBSTETRICS & GYNECOLOGY

## 2024-03-13 PROCEDURE — 84702 CHORIONIC GONADOTROPIN TEST: CPT | Performed by: OBSTETRICS & GYNECOLOGY

## 2024-03-13 PROCEDURE — 84144 ASSAY OF PROGESTERONE: CPT | Performed by: OBSTETRICS & GYNECOLOGY

## 2024-03-13 PROCEDURE — 36415 COLL VENOUS BLD VENIPUNCTURE: CPT | Performed by: OBSTETRICS & GYNECOLOGY

## 2024-03-18 DIAGNOSIS — E11.65 TYPE 2 DIABETES MELLITUS WITH HYPERGLYCEMIA, WITHOUT LONG-TERM CURRENT USE OF INSULIN: Primary | ICD-10-CM

## 2024-03-18 RX ORDER — LANCETS
EACH MISCELLANEOUS
Qty: 100 EACH | Refills: 11 | Status: SHIPPED | OUTPATIENT
Start: 2024-03-18 | End: 2024-04-09 | Stop reason: SDUPTHER

## 2024-03-18 RX ORDER — INSULIN PUMP SYRINGE, 3 ML
EACH MISCELLANEOUS
Qty: 1 EACH | Refills: 0 | Status: SHIPPED | OUTPATIENT
Start: 2024-03-18 | End: 2024-04-09 | Stop reason: SDUPTHER

## 2024-04-09 DIAGNOSIS — O24.111 PRE-EXISTING TYPE 2 DIABETES MELLITUS DURING PREGNANCY IN FIRST TRIMESTER: Primary | ICD-10-CM

## 2024-04-09 DIAGNOSIS — E11.65 TYPE 2 DIABETES MELLITUS WITH HYPERGLYCEMIA, WITHOUT LONG-TERM CURRENT USE OF INSULIN: ICD-10-CM

## 2024-04-09 RX ORDER — BUPROPION HYDROCHLORIDE 300 MG/1
300 TABLET ORAL
Qty: 30 TABLET | Refills: 3 | Status: SHIPPED | OUTPATIENT
Start: 2024-04-09

## 2024-04-09 RX ORDER — INSULIN PUMP SYRINGE, 3 ML
EACH MISCELLANEOUS
Qty: 1 EACH | Refills: 0 | Status: SHIPPED | OUTPATIENT
Start: 2024-04-09

## 2024-04-09 RX ORDER — LANCETS
EACH MISCELLANEOUS
Qty: 100 EACH | Refills: 11 | Status: SHIPPED | OUTPATIENT
Start: 2024-04-09 | End: 2024-04-18

## 2024-04-18 DIAGNOSIS — O24.111 PRE-EXISTING TYPE 2 DIABETES MELLITUS DURING PREGNANCY IN FIRST TRIMESTER: Primary | ICD-10-CM

## 2024-04-18 RX ORDER — LANCETS 28 GAUGE
1 EACH MISCELLANEOUS 3 TIMES DAILY
Qty: 300 EACH | Refills: 3 | Status: SHIPPED | OUTPATIENT
Start: 2024-04-18

## 2024-04-18 RX ORDER — BLOOD-GLUCOSE METER
1 KIT MISCELLANEOUS 3 TIMES DAILY
Qty: 300 STRIP | Refills: 3 | Status: SHIPPED | OUTPATIENT
Start: 2024-04-18

## 2024-06-05 ENCOUNTER — OFFICE VISIT (OUTPATIENT)
Dept: ENDOCRINOLOGY | Facility: CLINIC | Age: 29
End: 2024-06-05
Payer: OTHER GOVERNMENT

## 2024-06-05 ENCOUNTER — LAB VISIT (OUTPATIENT)
Dept: LAB | Facility: HOSPITAL | Age: 29
End: 2024-06-05
Attending: STUDENT IN AN ORGANIZED HEALTH CARE EDUCATION/TRAINING PROGRAM
Payer: OTHER GOVERNMENT

## 2024-06-05 VITALS
SYSTOLIC BLOOD PRESSURE: 118 MMHG | WEIGHT: 176.88 LBS | HEART RATE: 68 BPM | BODY MASS INDEX: 29.47 KG/M2 | DIASTOLIC BLOOD PRESSURE: 78 MMHG | HEIGHT: 65 IN

## 2024-06-05 DIAGNOSIS — R73.03 PREDIABETES: ICD-10-CM

## 2024-06-05 DIAGNOSIS — Z3A.16 16 WEEKS GESTATION OF PREGNANCY: ICD-10-CM

## 2024-06-05 DIAGNOSIS — E11.65 TYPE 2 DIABETES MELLITUS WITH HYPERGLYCEMIA, WITHOUT LONG-TERM CURRENT USE OF INSULIN: ICD-10-CM

## 2024-06-05 DIAGNOSIS — E11.65 TYPE 2 DIABETES MELLITUS WITH HYPERGLYCEMIA, WITHOUT LONG-TERM CURRENT USE OF INSULIN: Primary | ICD-10-CM

## 2024-06-05 LAB
ESTIMATED AVG GLUCOSE: 94 MG/DL (ref 68–131)
HBA1C MFR BLD: 4.9 % (ref 4–5.6)
TSH SERPL DL<=0.005 MIU/L-ACNC: 1.6 UIU/ML (ref 0.4–4)

## 2024-06-05 PROCEDURE — 99214 OFFICE O/P EST MOD 30 MIN: CPT | Mod: S$PBB,,, | Performed by: STUDENT IN AN ORGANIZED HEALTH CARE EDUCATION/TRAINING PROGRAM

## 2024-06-05 PROCEDURE — 84443 ASSAY THYROID STIM HORMONE: CPT | Performed by: STUDENT IN AN ORGANIZED HEALTH CARE EDUCATION/TRAINING PROGRAM

## 2024-06-05 PROCEDURE — 99999 PR PBB SHADOW E&M-EST. PATIENT-LVL IV: CPT | Mod: PBBFAC,,, | Performed by: STUDENT IN AN ORGANIZED HEALTH CARE EDUCATION/TRAINING PROGRAM

## 2024-06-05 PROCEDURE — 36415 COLL VENOUS BLD VENIPUNCTURE: CPT | Performed by: STUDENT IN AN ORGANIZED HEALTH CARE EDUCATION/TRAINING PROGRAM

## 2024-06-05 PROCEDURE — G2211 COMPLEX E/M VISIT ADD ON: HCPCS | Mod: S$PBB,,, | Performed by: STUDENT IN AN ORGANIZED HEALTH CARE EDUCATION/TRAINING PROGRAM

## 2024-06-05 PROCEDURE — 83036 HEMOGLOBIN GLYCOSYLATED A1C: CPT | Performed by: STUDENT IN AN ORGANIZED HEALTH CARE EDUCATION/TRAINING PROGRAM

## 2024-06-05 PROCEDURE — 99214 OFFICE O/P EST MOD 30 MIN: CPT | Mod: PBBFAC | Performed by: STUDENT IN AN ORGANIZED HEALTH CARE EDUCATION/TRAINING PROGRAM

## 2024-06-05 NOTE — PROGRESS NOTES
Subjective:      Patient ID: Rissa Parker is a 28 y.o. female.    Chief Complaint:  Prediabetes      History of Present Illness  This is a 26 y.o. female. with a past medical history of anxiety, depression, here for follow up of prediabetes in pregnancy.    Interval history:    She is now 16 weeks 3 days pregnant  Ozempic on hold given pregnancy    She was seeing the [facility] in [place] - Dr. Lafleur  Plans to move to [place] in 2024      Prediabetes  Diagnosed in   Reports weight was around 150 lb when she got COVID in late , since then was unable to exercise due to lung and heart issues - weight went up to 200s  A1c had gone up to 5.8% at that time  Started on GLP-1 RA in early  with success, weight back to 158 lb prior to pregnancy  She has gained about 13 lb since pregnancy on her home scale    POCT glucose  Fasting 95-98  2h postprandial     Lab Results   Component Value Date    HGBA1C 4.8 2024       Wt Readings from Last 6 Encounters:   24 80.2 kg (176 lb 14.4 oz)   24 71.7 kg (158 lb)   23 79.4 kg (175 lb)   23 92 kg (202 lb 13.2 oz)   02/15/23 90.7 kg (200 lb)   22 92 kg (202 lb 13.2 oz)     BMI Readings from Last 1 Encounters:   24 29.44 kg/m²       1 mg DST - however noted to be on estrogen containing vaginal ring     10/14/2021    Cortisol, 8 AM 4.10 (L)      10/22/2021    Cortisol, 24H Ur 14       Outside labs:  2021:   A1c 5.9%    2021  A1c 5.5%  TSH 1.4  Thyroxine 8.1 (N)    Aug 2021  A1c 5.8%  Glucose 86  Cr 0.82  AST/ALT   LDL 79  Tg 97  HDL 49    2022  Glucose 90  TSH 1.7  Lipids ok, LDL 77    2023  17OHP: 39 (normal)  Total T 20  PRL 13.7  DHEA-s 80.8  TSH 1.7  LDL 88, Hdl 53, Tg 110       2024  TSH 0.84  FT4 1.14    Obstetric US with normal anatomy and growth      Mother - repots low A1c and maternal aunt hypoglycemia  Sister no issues with glucose  Father side of the  "family: 2 members with Prader-Willi syndrome        Review of Systems  As above    Social and family history reviewed  Current medications and allergies reviewed    Objective:   /78   Pulse 68   Resp (P) 19   Ht 5' 5" (1.651 m)   Wt 80.2 kg (176 lb 14.4 oz)   BMI 29.44 kg/m²   Physical Exam   Alert, oriented    BP Readings from Last 1 Encounters:   06/05/24 118/78      Wt Readings from Last 1 Encounters:   06/05/24 1056 80.2 kg (176 lb 14.4 oz)     Body mass index is 29.44 kg/m².    Lab Review:   Lab Results   Component Value Date    HGBA1C 4.8 02/23/2024     Lab Results   Component Value Date    CHOL 162 02/20/2023    HDL 60 02/20/2023    LDLCALC 88.0 02/20/2023    TRIG 70 02/20/2023    CHOLHDL 37.0 02/20/2023     Lab Results   Component Value Date     02/20/2023    K 4.2 02/20/2023     02/20/2023    CO2 26 02/20/2023    GLU 96 02/20/2023    BUN 15 02/20/2023    CREATININE 0.9 02/20/2023    CALCIUM 9.9 02/20/2023    PROT 7.4 10/14/2021    ALBUMIN 3.6 10/14/2021    BILITOT 0.5 10/14/2021    ALKPHOS 77 10/14/2021    AST 20 10/14/2021    ALT 33 10/14/2021    ANIONGAP 10 02/20/2023    ESTGFRAFRICA >60 10/14/2021    EGFRNONAA >60 10/14/2021    TSH 0.898 08/21/2023       All pertinent labs reviewed    Assessment and Plan     Prediabetes  Glucose at goal for pregnancy without pharmacological therapy  Will recheck A1c as she is in second trimester  Continue POCT glucose checks fasting and 2h post meal  We did trial of CGM but had issues with connectivity and given excellent control, do not think absolutely necessary      Pregnancy goals   Fasting < 95   One-hour postprandial <=140 mg/dL   Two-hour postprandial <=120 mg/dL   A1c goal during pregnancy: < 6%      BMI 26.0-26.9,adult  GLP-1/GIP RA on hold given pregnancy    16 weeks gestation of pregnancy  Recheck TSH, goal in second trimester 0.2-2.5        Atif Hanna MD  Endocrinology              "

## 2024-06-05 NOTE — ASSESSMENT & PLAN NOTE
Glucose at goal for pregnancy without pharmacological therapy  Will recheck A1c as she is in second trimester  Continue POCT glucose checks fasting and 2h post meal  We did trial of CGM but had issues with connectivity and given excellent control, do not think absolutely necessary      Pregnancy goals   Fasting < 95   One-hour postprandial <=140 mg/dL   Two-hour postprandial <=120 mg/dL   A1c goal during pregnancy: < 6%

## 2024-06-06 ENCOUNTER — PATIENT MESSAGE (OUTPATIENT)
Dept: ENDOCRINOLOGY | Facility: CLINIC | Age: 29
End: 2024-06-06
Payer: OTHER GOVERNMENT

## 2024-06-20 ENCOUNTER — OFFICE VISIT (OUTPATIENT)
Dept: PSYCHIATRY | Facility: CLINIC | Age: 29
End: 2024-06-20
Payer: OTHER GOVERNMENT

## 2024-06-20 VITALS
WEIGHT: 182.69 LBS | HEIGHT: 65 IN | BODY MASS INDEX: 30.44 KG/M2 | RESPIRATION RATE: 17 BRPM | HEART RATE: 82 BPM | OXYGEN SATURATION: 98 % | DIASTOLIC BLOOD PRESSURE: 68 MMHG | SYSTOLIC BLOOD PRESSURE: 102 MMHG

## 2024-06-20 DIAGNOSIS — Z65.8 PSYCHOSOCIAL STRESSORS: ICD-10-CM

## 2024-06-20 DIAGNOSIS — F33.1 MODERATE EPISODE OF RECURRENT MAJOR DEPRESSIVE DISORDER: Primary | ICD-10-CM

## 2024-06-20 DIAGNOSIS — F41.0 PANIC ATTACKS: ICD-10-CM

## 2024-06-20 DIAGNOSIS — F41.1 GAD (GENERALIZED ANXIETY DISORDER): ICD-10-CM

## 2024-06-20 PROCEDURE — 99213 OFFICE O/P EST LOW 20 MIN: CPT | Mod: PBBFAC | Performed by: STUDENT IN AN ORGANIZED HEALTH CARE EDUCATION/TRAINING PROGRAM

## 2024-06-20 PROCEDURE — 99999 PR PBB SHADOW E&M-EST. PATIENT-LVL III: CPT | Mod: PBBFAC,,, | Performed by: STUDENT IN AN ORGANIZED HEALTH CARE EDUCATION/TRAINING PROGRAM

## 2024-06-20 RX ORDER — BUPROPION HYDROCHLORIDE 150 MG/1
150 TABLET ORAL DAILY
Qty: 90 TABLET | Refills: 0 | Status: SHIPPED | OUTPATIENT
Start: 2024-06-20 | End: 2025-06-20

## 2024-06-20 RX ORDER — ACETAMINOPHEN 160 MG/5ML
200 SUSPENSION, ORAL (FINAL DOSE FORM) ORAL 2 TIMES DAILY
COMMUNITY

## 2024-06-20 RX ORDER — NITROFURANTOIN 25; 75 MG/1; MG/1
100 CAPSULE ORAL 2 TIMES DAILY
COMMUNITY
Start: 2024-06-17

## 2024-06-20 NOTE — PROGRESS NOTES
"        06/20/2024  4:20 PM  Rissa Parker  [id number]    Outpatient Psychiatry Follow-Up Visit (MD/NP)    6/20/2024    Clinical Status of Patient:  Outpatient (Ambulatory)    Chief Complaint:  Rissa Parker is a 28 y.o. female who presents today for follow-up of depression and anxiety.  Met with patient.        Interval History and Content of Current Session:  Interim Events/Subjective Report/Content of Current Session:   MDD, recurrent, severe  JENNIFER with panic attacks  Psychosocial stressors  Obesity  Insomnia      Reports mood is "variable, some days okay, other days dumpy, I guess it evens out... it is harder to get out of bed." She is currently 18 weeks pregnant. Also bought a new house and is planning on moving to [place], movers coming next week. No longer taking wellbutrin due to pregnancy, stopped when she became pregnant. JENNIFER symptoms are "noticeable but manageable, I can mostly keep a handle on it." No recent panic attacks. Reports rarely takes xanax, reports only has taken 3 tablets in about the last year.        Psychiatric Review Of Systems - Is patient experiencing or having changes in:  sleep: yes, "hard to fall asleep."  appetite: "okay"  energy/anergy: low, "good for a few hours then tired."  interest/pleasure/anhedonia: less  Anxiety: variable   Panic: no  Guilty/hopelessness/worthlessness: less  concentration: yes, worse  S.I.B.s/risky behavior: no  Irritability: ""No more than usual"  Substance abuse: no  Racing thoughts: no  Impulsive behaviors: no   Paranoia: no  AVH: no      Psychotherapy:  Target symptoms: depression, anxiety   Why chosen therapy is appropriate versus another modality: relevant to diagnosis  Outcome monitoring methods: self-report  Therapeutic intervention type: supportive psychotherapy  Topics discussed/themes: building skills sets for symptom management, symptom recognition  The patient's response to the intervention is accepting. The patient's progress toward " treatment goals is fair.   Duration of intervention: 16 minutes.      Medical Review of Systems   Review of Systems   Constitutional:  Negative for chills and fever.   HENT:  Negative for hearing loss.    Eyes:  Negative for blurred vision and double vision.   Respiratory:  Negative for shortness of breath.    Cardiovascular:  Negative for chest pain.   Gastrointestinal:  Negative for constipation, diarrhea, nausea and vomiting.   Genitourinary:  Negative for dysuria.   Musculoskeletal:  Negative for back pain and neck pain.   Skin:  Negative for rash.   Neurological:  Negative for dizziness and headaches.   Endo/Heme/Allergies:  Negative for environmental allergies.       Past Medical, Family and Social History: The patient's past medical, family and social history have been reviewed and updated as appropriate within the electronic medical record - see encounter notes.        Social History     Socioeconomic History    Marital status:    Tobacco Use    Smoking status: Never    Smokeless tobacco: Never   Substance and Sexual Activity    Alcohol use: Yes     Comment: occasionally         Compliance: yes    Side effects: None    Risk Parameters:  Patient reports no suicidal ideation  Patient reports no homicidal ideation  Patient reports no self-injurious behavior  Patient reports no violent behavior        Exam (detailed: at least 9 elements; comprehensive: all 15 elements)     Vitals:    06/20/24 0828   BP: 102/68   Pulse: 82   Resp: 17       Wt Readings from Last 4 Encounters:   06/20/24 82.9 kg (182 lb 11.2 oz)   06/05/24 80.2 kg (176 lb 14.4 oz)   02/28/24 72.8 kg (160 lb 9.6 oz)   02/23/24 71.7 kg (158 lb)       Body mass index is 30.4 kg/m².        CONSTITUTIONAL  General Appearance: unremarkable, age appropriate    MUSCULOSKELETAL  Muscle Strength and Tone:no tremor, no tic  Abnormal Involuntary Movements: No  Gait and Station: non-ataxic    PSYCHIATRIC   Level of Consciousness: awake and alert  "  Orientation: person, place and situation  Grooming: Casually dressed and Well groomed  Psychomotor Behavior: normal, cooperative  Speech: normal tone, normal rate, normal pitch, normal volume  Language: grossly intact  Mood: "alright, stressed"  Affect: Consistent with mood  Thought Process: linear, logical  Associations: intact   Thought Content: DENIES suicidal ideation and DENIES homicidal ideation  Perceptions: denies hallucinations  Memory: Able to recall past events, Remote intact and Recent intact  Attention: Attends to interview without distraction  Fund of Knowledge: Aware of current events and Vocabulary appropriate   Estimate if Intelligence:  Average based on work/education history, vocabulary and mental status exam  Insight: has awareness of illness  Judgment: behavior is adequate to circumstances          Assessment and Diagnosis   Status/Progress: Based on the examination today, the patient's problem(s) is/are adequately but not ideally controlled.  New problems have not been presented today.   Co-morbidities are complicating management of the primary condition.        General Impression:        MDD, recurrent, severe  JENNIFER with panic attacks  Psychosocial stressors  Obesity     Insomnia       Plan:         MDD, recurrent, severe  - pt moving to [place] next week, she is planning to establish care there however has been unable to do so yet, will refill wellbutrin 150 mg PO qd to resume if needed should depression worsen or MDE occurs prior to being able to find mental health provider in [facility] if symptoms become impairing affecting self care, ADLs, hopelessness/worthlessness, etc. while pregnant, pt counseled extensively on benefits vs. risks for treatment vs. No treatment, including in context pregnancy and BF, also discussed non pharm options at length (psychotherapy, exercise, etc.), will need to follow up with provider in [place] [facility], will assist as possible in meanwhile   - continue psychotherapy with " Patricia Portillo  - pt counseled on diet, exercise, etc.  - pt counseled        JENNIFER with panic attacks  - continue xanax 0.25 mg PO qd PRN, takes rarely, can continue for now for severe panic attacks should they occur, +IC including pregnancy/BF  - continue psychotherapy with [facility]  - pt counseled on diet, exercise, etc.  - pt counseled        Psychosocial stressors  - pt counseled  - continue psychotherapy with [facility]        Obesity  - avoid medications with high potential for serious weight gain (remeron, seroquel, etc.)       Insomnia   - pt counseled  - using CPAP now and sleeping better, feels more rested, no longer taking ambien        Elevated A1c  - referred to endocrine, note reviewed          - Instructed patient to keep all scheduled appointments, take medications as prescribed and abstain from substance abuse. Instructed to call 911 or present to ER for emergency including SI or HI.    - Discussed diagnosis, risks and benefits of proposed treatment above vs alternative treatments vs no treatment, and potential side effects of these treatments including risks in pregnancy. Discussed the inherent unpredictability of treatment. The patient expresses understanding of the above and displays the capacity to agree with this treatment given said understanding. Patient also agrees that, currently, the benefits outweigh the risks and would like to pursue this treatment at this time.         Reilly Infante III, MD    Return to Clinic: 3 months

## 2024-09-11 NOTE — ASSESSMENT & PLAN NOTE
----- Message from Sophie GRANDE RN sent at 9/11/2024  9:36 AM CDT -----  Regarding: Elevated BP  Nigelstan        Venus Sarabai was evaluated in clinic today and was noted to have elevated blood pressures on both attempts. Please advise.      BP Readings from Last 1 Encounters:  09/11/24 0936 : (!) 150/68  09/11/24 0922 : (!) 146/72      Thank you!   Continue GLP-1 RA given weight loss benefit